# Patient Record
Sex: MALE | Race: WHITE | Employment: OTHER | URBAN - METROPOLITAN AREA
[De-identification: names, ages, dates, MRNs, and addresses within clinical notes are randomized per-mention and may not be internally consistent; named-entity substitution may affect disease eponyms.]

---

## 2018-09-12 PROBLEM — Z79.01 LONG TERM (CURRENT) USE OF ANTICOAGULANTS: Status: ACTIVE | Noted: 2018-09-12

## 2020-07-15 PROBLEM — E11.9 CONTROLLED TYPE 2 DIABETES MELLITUS WITHOUT COMPLICATION, WITHOUT LONG-TERM CURRENT USE OF INSULIN (HCC): Status: ACTIVE | Noted: 2020-07-15

## 2020-12-17 PROBLEM — I50.22 CHRONIC SYSTOLIC CONGESTIVE HEART FAILURE (HCC): Status: ACTIVE | Noted: 2020-12-17

## 2021-01-15 ENCOUNTER — HOSPITAL ENCOUNTER (OUTPATIENT)
Dept: LAB | Age: 86
Discharge: HOME | End: 2021-01-15
Payer: MEDICARE

## 2021-01-15 DIAGNOSIS — I25.10 ATHEROSCLEROSIS OF NATIVE CORONARY ARTERY OF NATIVE HEART WITHOUT ANGINA PECTORIS: ICD-10-CM

## 2021-01-15 DIAGNOSIS — I50.22 CHRONIC SYSTOLIC CONGESTIVE HEART FAILURE (HCC): ICD-10-CM

## 2021-01-15 DIAGNOSIS — I48.11 LONGSTANDING PERSISTENT ATRIAL FIBRILLATION (HCC): ICD-10-CM

## 2021-01-15 LAB
ANION GAP SERPL CALC-SCNC: 3 MMOL/L (ref 7–16)
BNP SERPL-MCNC: 2491 PG/ML
BUN SERPL-MCNC: 29 MG/DL (ref 8–23)
CALCIUM SERPL-MCNC: 9 MG/DL (ref 8.3–10.4)
CHLORIDE SERPL-SCNC: 109 MMOL/L (ref 98–107)
CO2 SERPL-SCNC: 29 MMOL/L (ref 21–32)
CREAT SERPL-MCNC: 0.95 MG/DL (ref 0.8–1.5)
GLUCOSE SERPL-MCNC: 87 MG/DL (ref 65–100)
INR PPP: 1.9
POTASSIUM SERPL-SCNC: 3.9 MMOL/L (ref 3.5–5.1)
PROTHROMBIN TIME: 21.7 SEC (ref 12.5–14.7)
SODIUM SERPL-SCNC: 141 MMOL/L (ref 138–145)

## 2021-01-15 PROCEDURE — 83880 ASSAY OF NATRIURETIC PEPTIDE: CPT

## 2021-01-15 PROCEDURE — 36415 COLL VENOUS BLD VENIPUNCTURE: CPT

## 2021-01-15 PROCEDURE — 85610 PROTHROMBIN TIME: CPT

## 2021-01-15 PROCEDURE — 80048 BASIC METABOLIC PNL TOTAL CA: CPT

## 2021-02-15 PROBLEM — N40.1 BENIGN PROSTATIC HYPERPLASIA WITH URINARY FREQUENCY: Status: ACTIVE | Noted: 2021-02-15

## 2021-02-15 PROBLEM — R35.0 BENIGN PROSTATIC HYPERPLASIA WITH URINARY FREQUENCY: Status: ACTIVE | Noted: 2021-02-15

## 2022-03-18 PROBLEM — I50.22 CHRONIC SYSTOLIC CONGESTIVE HEART FAILURE (HCC): Status: ACTIVE | Noted: 2020-12-17

## 2022-03-18 PROBLEM — E11.9 CONTROLLED TYPE 2 DIABETES MELLITUS WITHOUT COMPLICATION, WITHOUT LONG-TERM CURRENT USE OF INSULIN (HCC): Status: ACTIVE | Noted: 2020-07-15

## 2022-03-19 PROBLEM — R35.0 BENIGN PROSTATIC HYPERPLASIA WITH URINARY FREQUENCY: Status: ACTIVE | Noted: 2021-02-15

## 2022-03-19 PROBLEM — Z79.01 LONG TERM (CURRENT) USE OF ANTICOAGULANTS: Status: ACTIVE | Noted: 2018-09-12

## 2022-03-19 PROBLEM — N40.1 BENIGN PROSTATIC HYPERPLASIA WITH URINARY FREQUENCY: Status: ACTIVE | Noted: 2021-02-15

## 2022-05-24 ENCOUNTER — ANTI-COAG VISIT (OUTPATIENT)
Dept: CARDIOLOGY CLINIC | Age: 87
End: 2022-05-24
Payer: MEDICARE

## 2022-05-24 DIAGNOSIS — Z95.2 H/O AORTIC VALVE REPLACEMENT: ICD-10-CM

## 2022-05-24 DIAGNOSIS — Z79.01 LONG TERM (CURRENT) USE OF ANTICOAGULANTS: ICD-10-CM

## 2022-05-24 DIAGNOSIS — I48.91 ATRIAL FIBRILLATION (HCC): Primary | ICD-10-CM

## 2022-05-24 LAB
POC INR: 2.5
PROTHROMBIN TIME, POC: NORMAL
VALID INTERNAL CONTROL, POC: YES

## 2022-05-24 PROCEDURE — 85610 PROTHROMBIN TIME: CPT | Performed by: INTERNAL MEDICINE

## 2022-05-24 PROCEDURE — 93793 ANTICOAG MGMT PT WARFARIN: CPT | Performed by: INTERNAL MEDICINE

## 2022-05-24 NOTE — PROGRESS NOTES
Anticoagulation Summary  As of 5/24/2022    INR goal:  2.0-3.0   TTR:  --   INR used for dosing:     Warfarin maintenance plan:  5 mg (5 mg x 1) every Mon, Wed, Fri; 7.5 mg (5 mg x 1.5) all other days   Weekly warfarin total:  45 mg   Plan last modified:  Janelle Gray MA (5/24/2022)   Next INR check:  6/21/2022   Target end date:   Indefinite    Indications    H/O aortic valve replacement [Z95.2]  Atrial fibrillation ([facility] [address] [I48.91]  Long term (current) use of anticoagulants [Z79.01]             Anticoagulation Episode Summary     INR check location:  Anticoagulation Clinic    Preferred lab:      Send INR reminders to:  [facility] CARDIOLOGY [facility]    Comments:        Anticoagulation Care Providers     Provider Role Specialty Phone number    Vera Bradley MD [facility] Cardiology [phone]

## 2022-06-21 ENCOUNTER — ANTI-COAG VISIT (OUTPATIENT)
Dept: CARDIOLOGY CLINIC | Age: 87
End: 2022-06-21
Payer: MEDICARE

## 2022-06-21 DIAGNOSIS — I48.91 ATRIAL FIBRILLATION (HCC): ICD-10-CM

## 2022-06-21 DIAGNOSIS — Z95.2 H/O AORTIC VALVE REPLACEMENT: Primary | ICD-10-CM

## 2022-06-21 DIAGNOSIS — Z79.01 LONG TERM (CURRENT) USE OF ANTICOAGULANTS: ICD-10-CM

## 2022-06-21 LAB
POC INR: 2.7
PROTHROMBIN TIME, POC: NORMAL
VALID INTERNAL CONTROL, POC: YES

## 2022-06-21 PROCEDURE — 93793 ANTICOAG MGMT PT WARFARIN: CPT | Performed by: INTERNAL MEDICINE

## 2022-06-21 PROCEDURE — 85610 PROTHROMBIN TIME: CPT | Performed by: INTERNAL MEDICINE

## 2022-06-21 NOTE — PROGRESS NOTES
Anticoagulation Summary  As of 2022    INR goal:  2.0-3.0   TTR:  100.0 % (2.6 wk)   INR used for dosin.7 (2022)   Warfarin maintenance plan:  5 mg (5 mg x 1) every Mon, Wed, Fri; 7.5 mg (5 mg x 1.5) all other days   Weekly warfarin total:  45 mg   Plan last modified:  Ashanti Small MA (2022)   Next INR check:  2022   Target end date:   Indefinite    Indications    H/O aortic valve replacement [Z95.2]  Atrial fibrillation ([address] [I48.91]  Long term (current) use of anticoagulants [Z79.01]             Anticoagulation Episode Summary     INR check location:  Anticoagulation Clinic    Preferred lab:      Send INR reminders to:  [facility] CARDIOLOGY [facility]    Comments:        Anticoagulation Care Providers     Provider Role Specialty Phone number    Tyler Ross MD [facility] Cardiology [phone]

## 2022-06-21 NOTE — PATIENT INSTRUCTIONS
Reminder: Please contact the Coumadin Clinic at [phone]  when you have medication changes. Examples, new medications, antibiotics, discontinued medications, new supplements, missed doses of warfarin or if you took extra doses of warfarin. This also includes OTC medications. Notifying us helps reduce the possibility of high and low INR's. In addition, if warfarin needs to be held for any procedures, please have surgeon or physician's office contact us before holding anticoagulant. Thanks, [address] Cardiology Coumadin Clinic.

## 2022-07-18 ENCOUNTER — ANTI-COAG VISIT (OUTPATIENT)
Dept: CARDIOLOGY CLINIC | Age: 87
End: 2022-07-18
Payer: MEDICARE

## 2022-07-18 DIAGNOSIS — Z95.2 H/O AORTIC VALVE REPLACEMENT: Primary | ICD-10-CM

## 2022-07-18 DIAGNOSIS — Z79.01 LONG TERM (CURRENT) USE OF ANTICOAGULANTS: ICD-10-CM

## 2022-07-18 DIAGNOSIS — I48.91 ATRIAL FIBRILLATION (HCC): ICD-10-CM

## 2022-07-18 LAB
POC INR: 3
PROTHROMBIN TIME, POC: NORMAL
VALID INTERNAL CONTROL, POC: YES

## 2022-07-18 PROCEDURE — 85610 PROTHROMBIN TIME: CPT | Performed by: INTERNAL MEDICINE

## 2022-07-18 PROCEDURE — 93793 ANTICOAG MGMT PT WARFARIN: CPT | Performed by: INTERNAL MEDICINE

## 2022-07-18 NOTE — PROGRESS NOTES
Anticoagulation Summary  As of 7/18/2022      INR goal:  2.0-3.0   TTR:  100.0 % (1.5 mo)   INR used for dosing:  3.0 (7/18/2022)   Warfarin maintenance plan:  5 mg (5 mg x 1) every Mon, Wed, Fri; 7.5 mg (5 mg x 1.5) all other days   Weekly warfarin total:  45 mg   Plan last modified:  Tasneem Hamilton MA (5/24/2022)   Next INR check:     Target end date:   Indefinite    Indications    H/O aortic valve replacement [Z95.2]  Atrial fibrillation ([facility] [address] [I48.91]  Long term (current) use of anticoagulants [Z79.01]                 Anticoagulation Episode Summary       INR check location:  Anticoagulation Clinic    Preferred lab:      Send INR reminders to:  [facility] CARDIOLOGY [facility]    Comments:            Anticoagulation Care Providers       Provider Role Specialty Phone number    Bob Tello MD [facility] Cardiology [phone]

## 2022-07-18 NOTE — PATIENT INSTRUCTIONS
Reminder: Please contact the Coumadin Clinic at [phone]  when you have medication changes. Examples, new medications, antibiotics, discontinued medications, new supplements, missed doses of warfarin or if you took extra doses of warfarin. This also includes OTC medications. Notifying us helps reduce the possibility of high and low INR's. In addition, if warfarin needs to be held for any procedures, please have surgeon or physician's office contact us before holding anticoagulant. Thanks, [facility] Cardiology Coumadin Clinic.

## 2022-08-16 ENCOUNTER — ANTI-COAG VISIT (OUTPATIENT)
Dept: CARDIOLOGY CLINIC | Age: 87
End: 2022-08-16
Payer: MEDICARE

## 2022-08-16 DIAGNOSIS — I48.91 ATRIAL FIBRILLATION (HCC): ICD-10-CM

## 2022-08-16 DIAGNOSIS — Z95.2 H/O AORTIC VALVE REPLACEMENT: Primary | ICD-10-CM

## 2022-08-16 DIAGNOSIS — Z79.01 LONG TERM (CURRENT) USE OF ANTICOAGULANTS: ICD-10-CM

## 2022-08-16 LAB
POC INR: 2.4
PROTHROMBIN TIME, POC: YES

## 2022-08-16 PROCEDURE — 85610 PROTHROMBIN TIME: CPT | Performed by: INTERNAL MEDICINE

## 2022-08-16 PROCEDURE — 93793 ANTICOAG MGMT PT WARFARIN: CPT | Performed by: INTERNAL MEDICINE

## 2022-08-16 NOTE — PROGRESS NOTES
Anticoagulation Summary  As of 2022      INR goal:  2.0-3.0   TTR:  100.0 % (2.5 mo)   INR used for dosin.4 (2022)   Warfarin maintenance plan:  5 mg (5 mg x 1) every Mon, Wed, Fri; 7.5 mg (5 mg x 1.5) all other days   Weekly warfarin total:  45 mg   Plan last modified:  Gerson Romero MA (2022)   Next INR check:  2022   Target end date:   Indefinite    Indications    H/O aortic valve replacement [Z95.2]  Atrial fibrillation ([address] [I48.91]  Long term (current) use of anticoagulants [Z79.01]                 Anticoagulation Episode Summary       INR check location:  Anticoagulation Clinic    Preferred lab:      Send INR reminders to:  [facility]OLOG[facility]    Comments:            Anticoagulation Care Providers       Provider Role Specialty Phone number    Erin Gregg MD [facility] Cardiology [phone]

## 2022-08-16 NOTE — PATIENT INSTRUCTIONS
Reminder: Please contact the Coumadin Clinic at [phone]  when you have medication changes. Examples, new medications, antibiotics, discontinued medications, new supplements, missed doses of warfarin or if you took extra doses of warfarin. This also includes OTC medications. Notifying us helps reduce the possibility of high and low INR's. In addition, if warfarin needs to be held for any procedures, please have surgeon or physician's office contact us before holding anticoagulant. Thanks, [facility] Cardiology Coumadin Clinic.

## 2022-09-13 ENCOUNTER — ANTI-COAG VISIT (OUTPATIENT)
Dept: CARDIOLOGY CLINIC | Age: 87
End: 2022-09-13
Payer: MEDICARE

## 2022-09-13 DIAGNOSIS — Z95.2 H/O AORTIC VALVE REPLACEMENT: Primary | ICD-10-CM

## 2022-09-13 DIAGNOSIS — I48.91 ATRIAL FIBRILLATION (HCC): ICD-10-CM

## 2022-09-13 DIAGNOSIS — Z79.01 LONG TERM (CURRENT) USE OF ANTICOAGULANTS: ICD-10-CM

## 2022-09-13 LAB
POC INR: 2.1
PROTHROMBIN TIME, POC: YES

## 2022-09-13 PROCEDURE — 93793 ANTICOAG MGMT PT WARFARIN: CPT | Performed by: INTERNAL MEDICINE

## 2022-09-13 PROCEDURE — 85610 PROTHROMBIN TIME: CPT | Performed by: INTERNAL MEDICINE

## 2022-09-13 NOTE — PROGRESS NOTES
Anticoagulation Summary  As of 2022      INR goal:  2.0-3.0   TTR:  100.0 % (3.4 mo)   INR used for dosin.1 (2022)   Warfarin maintenance plan:  5 mg (5 mg x 1) every Mon, Wed, Fri; 7.5 mg (5 mg x 1.5) all other days   Weekly warfarin total:  45 mg   Plan last modified:  Rina Paez MA (2022)   Next INR check:  10/11/2022   Target end date:   Indefinite    Indications    H/O aortic valve replacement [Z95.2]  Atrial fibrillation ([address] [I48.91]  Long term (current) use of anticoagulants [Z79.01]                 Anticoagulation Episode Summary       INR check location:  Anticoagulation Clinic    Preferred lab:      Send INR reminders to:  [facility]    Comments:            Anticoagulation Care Providers       Provider Role Specialty Phone number    Lindsay Mcdermott MD [facility] [phone]

## 2022-09-13 NOTE — PATIENT INSTRUCTIONS
Reminder: Please contact the Coumadin Clinic at [phone]  when you have medication changes. Examples, new medications, antibiotics, discontinued medications, new supplements, missed doses of warfarin or if you took extra doses of warfarin. This also includes OTC medications. Notifying us helps reduce the possibility of high and low INR's. In addition, if warfarin needs to be held for any procedures, please have surgeon or physician's office contact us before holding anticoagulant. Thanks, [facility] Cardiology Coumadin Clinic.

## 2022-10-11 RX ORDER — ATORVASTATIN CALCIUM 40 MG/1
TABLET, FILM COATED ORAL
Qty: 90 TABLET | Refills: 3 | Status: SHIPPED | OUTPATIENT
Start: 2022-10-11

## 2022-10-13 DIAGNOSIS — I50.22 CHRONIC SYSTOLIC (CONGESTIVE) HEART FAILURE (HCC): ICD-10-CM

## 2022-10-13 RX ORDER — FUROSEMIDE 40 MG/1
TABLET ORAL
Qty: 90 TABLET | Refills: 3 | Status: SHIPPED | OUTPATIENT
Start: 2022-10-13

## 2022-10-19 ENCOUNTER — ANTI-COAG VISIT (OUTPATIENT)
Dept: CARDIOLOGY CLINIC | Age: 87
End: 2022-10-19
Payer: MEDICARE

## 2022-10-19 ENCOUNTER — OFFICE VISIT (OUTPATIENT)
Dept: CARDIOLOGY CLINIC | Age: 87
End: 2022-10-19
Payer: MEDICARE

## 2022-10-19 VITALS
DIASTOLIC BLOOD PRESSURE: 52 MMHG | BODY MASS INDEX: 21.4 KG/M2 | HEART RATE: 54 BPM | SYSTOLIC BLOOD PRESSURE: 116 MMHG | WEIGHT: 158 LBS | HEIGHT: 72 IN

## 2022-10-19 DIAGNOSIS — I10 ESSENTIAL HYPERTENSION: ICD-10-CM

## 2022-10-19 DIAGNOSIS — I50.22 CHRONIC SYSTOLIC CONGESTIVE HEART FAILURE (HCC): Primary | ICD-10-CM

## 2022-10-19 DIAGNOSIS — E78.2 MIXED HYPERLIPIDEMIA: ICD-10-CM

## 2022-10-19 DIAGNOSIS — I48.91 ATRIAL FIBRILLATION (HCC): ICD-10-CM

## 2022-10-19 DIAGNOSIS — Z79.01 LONG TERM (CURRENT) USE OF ANTICOAGULANTS: ICD-10-CM

## 2022-10-19 DIAGNOSIS — I48.11 LONGSTANDING PERSISTENT ATRIAL FIBRILLATION (HCC): ICD-10-CM

## 2022-10-19 DIAGNOSIS — Z95.2 H/O AORTIC VALVE REPLACEMENT: Primary | ICD-10-CM

## 2022-10-19 DIAGNOSIS — I25.10 ATHEROSCLEROSIS OF NATIVE CORONARY ARTERY OF NATIVE HEART WITHOUT ANGINA PECTORIS: ICD-10-CM

## 2022-10-19 LAB
POC INR: 2
PROTHROMBIN TIME, POC: YES

## 2022-10-19 PROCEDURE — G8484 FLU IMMUNIZE NO ADMIN: HCPCS | Performed by: INTERNAL MEDICINE

## 2022-10-19 PROCEDURE — 1123F ACP DISCUSS/DSCN MKR DOCD: CPT | Performed by: INTERNAL MEDICINE

## 2022-10-19 PROCEDURE — 4004F PT TOBACCO SCREEN RCVD TLK: CPT | Performed by: INTERNAL MEDICINE

## 2022-10-19 PROCEDURE — 85610 PROTHROMBIN TIME: CPT | Performed by: INTERNAL MEDICINE

## 2022-10-19 PROCEDURE — G8420 CALC BMI NORM PARAMETERS: HCPCS | Performed by: INTERNAL MEDICINE

## 2022-10-19 PROCEDURE — 99214 OFFICE O/P EST MOD 30 MIN: CPT | Performed by: INTERNAL MEDICINE

## 2022-10-19 PROCEDURE — G8428 CUR MEDS NOT DOCUMENT: HCPCS | Performed by: INTERNAL MEDICINE

## 2022-10-19 ASSESSMENT — ENCOUNTER SYMPTOMS: SHORTNESS OF BREATH: 0

## 2022-10-19 NOTE — PROGRESS NOTES
Anticoagulation Summary  As of 10/19/2022      INR goal:  2.0-3.0   TTR:  100.0 % (4.6 mo)   INR used for dosin.0 (10/19/2022)   Warfarin maintenance plan:  5 mg (5 mg x 1) every Mon, Wed, Fri; 7.5 mg (5 mg x 1.5) all other days   Weekly warfarin total:  45 mg   Plan last modified:  Seb Leger MA (2022)   Next INR check:  2022   Target end date:   Indefinite    Indications    H/O aortic valve replacement [Z95.2]  Longstanding persistent atrial fibrillation ([address] [I48.11]  Long term (current) use of anticoagulants [Z79.01]                 Anticoagulation Episode Summary       INR check location:  Anticoagulation Clinic    Preferred lab:      Send INR reminders to:  [facility] PT    Comments:            Anticoagulation Care Providers       Provider Role Specialty Phone number    Renata Mcdaniels MD [facility] Cardiology [phone]

## 2022-10-19 NOTE — PROGRESS NOTES
[address] [place], [place]  [address], [address]  PHONE: [phone]    Den Carmichael  1934       SUBJECTIVE:   Den Carmichael is a 80 y.o. male seen for a follow up visit regarding the following:     Chief Complaint   Patient presents with    Congestive Heart Failure       HPI:    Den Carmichael presents for routine follow up. Multiple issues addressed. CONGESTIVE HEART FAILURE:  Patient denies any PND, orthopnea or edema. Weights at home have been stable. Compliance with medical therapy. Stable dyspnea on exertion. Coronary Artery Disease:  Patient denies any recent angina. Notes compliance with medical therapy. No recent NTG use. No intolerance to anti-platelet therapy. AVR:  No fever or chills. Hypertension:  Ambulatory BP readings have been controlled. Patient reports compliance with medical therapy without side effects. Hyperlipidemia:   Tolerating Lipitor. Persistent atrial fibrillation:  No palpitations or tachycardia. Tolerating Coumadin. No blood in stool or excessive bleeding. INR 2.0. Does not want to watchman at present time. Had prior retroperitoneal bleed. Past Medical History, Past Surgical History, Family history, Social History, and Medications were all reviewed with the patient today and updated as necessary. Current Outpatient Medications:     furosemide (LASIX) 40 MG tablet, TAKE 1 (ONE) TABLET BY MOUTH EVERY DAY, Disp: 90 tablet, Rfl: 3    atorvastatin (LIPITOR) 40 MG tablet, TAKE 1 (ONE) TABLET BY MOUTH EVERY DAY, Disp: 90 tablet, Rfl: 3    acetaminophen (TYLENOL) 325 MG tablet, Take by mouth every 4 hours as needed, Disp: , Rfl:     digoxin (LANOXIN) 125 MCG tablet, Take 0.125 mg by mouth daily, Disp: , Rfl:     gabapentin (NEURONTIN) 300 MG capsule, Take 300 mg by mouth 3 times daily. , Disp: , Rfl:     lisinopril (PRINIVIL;ZESTRIL) 10 MG tablet, Take 10 mg by mouth daily, Disp: , Rfl: metoprolol succinate (TOPROL XL) 100 MG extended release tablet, Take 100 mg by mouth daily, Disp: , Rfl:     primidone (MYSOLINE) 50 MG tablet, Take 50 mg by mouth daily, Disp: , Rfl:     tamsulosin (FLOMAX) 0.4 MG capsule, TAKE 1 (ONE) CAPSULE NIGHTLY., Disp: , Rfl:     warfarin (COUMADIN) 5 MG tablet, Take 1 to 1 1/2 tablets daily or as directed by [facility] Cardiology Coumadin Clinic, Disp: , Rfl:     warfarin (COUMADIN) 7.5 MG tablet, Take 1 tab 7 x weekly by mouth, or as directed by [facility] Cardiology. , Disp: , Rfl:      Not on File     Patient Active Problem List    Diagnosis Date Noted    Benign prostatic hyperplasia with urinary frequency 02/15/2021     Priority: Low    Chronic systolic congestive heart failure ([facility] [address] 12/17/2020     Priority: Low     1.  Echo (12/15/20):  EF 15-20%. Severe RV dysfunction. Normal   bioprosthetic AV. MG 8. PG 13. Moderate MR. Severe TR.  RVSP 36.   2.  Echo (5/3/21):  EF 45-50%. +DD. Mild RV HK. AVR:  Mild AR. MG 12.   PG 22. Moderate MR. Severe TR.  RVSP 51. Controlled type 2 diabetes mellitus without complication, without long-term current use of insulin ([facility] [address] 07/15/2020     Priority: Low    Long term (current) use of anticoagulants 09/12/2018     Priority: Low    Longstanding persistent atrial fibrillation (HCC)      Priority: Low     1. Initially diagnosed 6/17/10 during office exam. Rate control and   anticoagulation strategy. H/O aortic valve replacement 02/11/2016     Priority: Low     1. AVR with 23 mm pericardial valve (10/11/05): Prior severe calcific   aortic stenosis. 2. Echo (6/25/13): Normal LV systolic function. EF 53%. Moderate right   atrial enlargement. Normal bioprosthetic aortic valve. Peak gradient 15   mean gradient 9. Moderate tricuspid regurgitation. RVSP 55.  3. Echo (7/6/15): EF 65-70%. Normal aortic valve prosthesis. Severe   Tricuspid regurgitation. Moderately dilated RV with mild RV hypokinesis.    RVSP 35.  4.  Echo (11/2/16):  EF 57%. Normal bioprosthetic aortic valve. MG 12.    PG 18. Moderate TR.   RVSP 44  5. Echo (6/4/18):  EF 59%. Bi-atrial enlargement. Bioprosthetic Aortic   Valve. MG 10. PG 18. Mild to moderate MR. Moderate TR. RVSP 38  6. Echo (6/26/19):  EF 55-60%. Severe bi-atrial enlargement. Bioprosthetic AV. MG 13. PG 21. Mild MR. Moderate to severe TR. Low back pain 02/11/2016     Priority: Low    Hyperlipidemia 02/11/2016     Priority: Low    Essential hypertension 02/11/2016     Priority: Low    Atherosclerosis of native coronary artery of native heart without angina pectoris 02/11/2016     Priority: Low     1. 4 VZ CABG (10/11/05): LIMA to LAD, SVG to OM1, SVG to diagonal, SVG to   PDA        Carotid artery stenosis without cerebral infarction 02/11/2016     Priority: Low     1. Carotid Doppler (4/22/09): Right ICA with 1-15% stenosis. Left ICA with   16-49% stenosis  2. Carotid Doppler (1/12/12): Right ICA with 1-15% stenosis. Left ICA with   16-49% stenosis            Social History     Tobacco Use    Smoking status: Never    Smokeless tobacco: Never   Substance Use Topics    Alcohol use: No       ROS:    Review of Systems   Constitutional: Negative for malaise/fatigue. Cardiovascular:  Negative for chest pain. Respiratory:  Negative for shortness of breath. Hematologic/Lymphatic: Does not bruise/bleed easily. Musculoskeletal:  Positive for arthritis. Neurological:  Negative for focal weakness. Psychiatric/Behavioral:  Negative for depression. PHYSICAL EXAM:  Wt Readings from Last 3 Encounters:   10/19/22 158 lb (71.7 kg)   04/14/22 163 lb (73.9 kg)   01/19/22 147 lb (66.7 kg)     BP Readings from Last 3 Encounters:   10/19/22 (!) 116/52   04/14/22 138/69   01/19/22 117/63     Pulse Readings from Last 3 Encounters:   10/19/22 54   04/14/22 54   01/19/22 75       Physical Exam  Constitutional:       General: He is not in acute distress.   Neck:      Vascular: No carotid bruit. Cardiovascular:      Rate and Rhythm: Normal rate. Rhythm irregular. Pulmonary:      Effort: No respiratory distress. Breath sounds: Normal breath sounds. Abdominal:      General: Bowel sounds are normal.      Palpations: Abdomen is soft. Musculoskeletal:         General: Swelling (trivial to 1+) present. Skin:     General: Skin is warm and dry. Neurological:      General: No focal deficit present. Psychiatric:         Mood and Affect: Mood normal.       Medical problems and test results were reviewed with the patient today. No results found for: WBC, HGB, HCT, MCV, PLT    Lab Results   Component Value Date/Time     01/15/2021 04:34 PM    K 3.9 01/15/2021 04:34 PM     01/15/2021 04:34 PM    CO2 29 01/15/2021 04:34 PM    BUN 29 01/15/2021 04:34 PM    CREATININE 0.95 01/15/2021 04:34 PM    GLUCOSE 87 01/15/2021 04:34 PM    CALCIUM 9.0 01/15/2021 04:34 PM        No results found for: CHOL  No results found for: TRIG  No results found for: HDL  No results found for: LDLCHOLESTEROL, LDLCALC  No results found for: LABVLDL, VLDL  No results found for: CHOLHDLRATIO     Data from outside records/labs from outside providers have been reviewed and summarized as noted in the HPI, past history and data review sections of this note       ASSESSMENT and PLAN      1. Chronic systolic congestive heart failure (HCC)  Check echo. Continue Toprol and Lisinopril. 2. Atherosclerosis of native coronary artery of native heart without angina pectoris  Patient is doing well without any anginal symptoms. We discussed the importance of continued risk factor modification. The patient is encouraged to contact my office with any worsening dyspnea or chest discomfort. 3. Essential hypertension  BP controlled. Continue Lisinopril and Toprol. 4. Longstanding persistent atrial fibrillation (HCC)  Rate controlled. Continue Lanoxin and coumadin.   Goal INR 2-3.      5. Mixed hyperlipidemia  Continue Lipitor. Connor Tobin MD  10/19/2022  11:41 AM    This note may have been dictated using speech recognition software.   As a result, error of speech recognition may have occurred

## 2022-10-19 NOTE — PATIENT INSTRUCTIONS
Reminder: Please contact the Coumadin Clinic at [phone]  when you have medication changes. Examples, new medications, antibiotics, discontinued medications, new supplements, missed doses of warfarin or if you took extra doses of warfarin. This also includes OTC medications. Notifying us helps reduce the possibility of high and low INR's. In addition, if warfarin needs to be held for any procedures, please have surgeon or physician's office contact us before holding anticoagulant. Thanks, [facility] Cardiology Coumadin Clinic.

## 2022-11-16 ENCOUNTER — ANTI-COAG VISIT (OUTPATIENT)
Dept: CARDIOLOGY CLINIC | Age: 87
End: 2022-11-16
Payer: MEDICARE

## 2022-11-16 DIAGNOSIS — I48.91 ATRIAL FIBRILLATION (HCC): ICD-10-CM

## 2022-11-16 DIAGNOSIS — I48.11 LONGSTANDING PERSISTENT ATRIAL FIBRILLATION (HCC): ICD-10-CM

## 2022-11-16 DIAGNOSIS — Z95.2 H/O AORTIC VALVE REPLACEMENT: Primary | ICD-10-CM

## 2022-11-16 DIAGNOSIS — Z79.01 LONG TERM (CURRENT) USE OF ANTICOAGULANTS: ICD-10-CM

## 2022-11-16 LAB
POC INR: 2.2
PROTHROMBIN TIME, POC: YES

## 2022-11-16 PROCEDURE — 85610 PROTHROMBIN TIME: CPT | Performed by: INTERNAL MEDICINE

## 2022-11-16 PROCEDURE — 93793 ANTICOAG MGMT PT WARFARIN: CPT | Performed by: INTERNAL MEDICINE

## 2022-11-16 NOTE — PATIENT INSTRUCTIONS
Reminder: Please contact the Coumadin Clinic at [phone]  when you have medication changes. Examples, new medications, antibiotics, discontinued medications, new supplements, missed doses of warfarin or if you took extra doses of warfarin. This also includes OTC medications. Notifying us helps reduce the possibility of high and low INR's. In addition, if warfarin needs to be held for any procedures, please have surgeon or physician's office contact us before holding anticoagulant. Thanks, [facility] Cardiology Coumadin Clinic.

## 2022-11-17 RX ORDER — DIGOXIN 125 MCG
0.12 TABLET ORAL DAILY
Qty: 90 TABLET | Refills: 3 | Status: SHIPPED | OUTPATIENT
Start: 2022-11-17

## 2022-11-17 RX ORDER — METOPROLOL SUCCINATE 100 MG/1
100 TABLET, EXTENDED RELEASE ORAL DAILY
Qty: 90 TABLET | Refills: 3 | Status: SHIPPED | OUTPATIENT
Start: 2022-11-17

## 2022-11-17 NOTE — TELEPHONE ENCOUNTER
Prescriptions sent to pharmacy//[facility]  Requested Prescriptions     Signed Prescriptions Disp Refills    digoxin (LANOXIN) 125 MCG tablet 90 tablet 3     Sig: Take 1 tablet by mouth daily     Authorizing Provider: Nadege Sweet     Ordering User: Alicia Ferreira    metoprolol succinate (TOPROL XL) 100 MG extended release tablet 90 tablet 3     Sig: Take 1 tablet by mouth daily     Authorizing Provider: Nadege Sweet     Ordering User: Alicia Ferreira

## 2022-12-22 ENCOUNTER — ANTI-COAG VISIT (OUTPATIENT)
Dept: CARDIOLOGY CLINIC | Age: 87
End: 2022-12-22

## 2022-12-22 DIAGNOSIS — Z79.01 LONG TERM (CURRENT) USE OF ANTICOAGULANTS: ICD-10-CM

## 2022-12-22 DIAGNOSIS — I48.11 LONGSTANDING PERSISTENT ATRIAL FIBRILLATION (HCC): ICD-10-CM

## 2022-12-22 DIAGNOSIS — Z95.2 H/O AORTIC VALVE REPLACEMENT: Primary | ICD-10-CM

## 2022-12-22 DIAGNOSIS — I48.91 ATRIAL FIBRILLATION (HCC): ICD-10-CM

## 2022-12-22 LAB
POC INR: 5
PROTHROMBIN TIME, POC: YES

## 2022-12-22 NOTE — PROGRESS NOTES
Pt is above range at 5.0 today. Pt denies any medication or diet changes. Spoke with Dr. Harpal Francis. Per Dr. Harpal Francis, [facility] one day. On one of his 5 mg days take 1/2 a tablet and on one 7.5 mg day take 1 tablet. \" Pt had already taken warfarin today. Will recheck in 1 week. Pt is agreeable with plan. Anticoagulation Summary  As of 2022      INR goal:  2.0-3.0   TTR:  87.3 % (6.7 mo)   INR used for dosin.0 (2022)   Warfarin maintenance plan:  5 mg (5 mg x 1) every Mon, Wed, Fri; 7.5 mg (5 mg x 1.5) all other days; Starting 2022   Weekly warfarin total:  45 mg   Plan last modified:  Luzma De Dios MA (2022)   Next INR check:  2022   Target end date:   Indefinite    Indications    H/O aortic valve replacement [Z95.2]  Longstanding persistent atrial fibrillation ([address] [I48.11]  Long term (current) use of anticoagulants [Z79.01]                 Anticoagulation Episode Summary       INR check location:  Anticoagulation Clinic    Preferred lab:      Send INR reminders to:  [address] PT    Comments:            Anticoagulation Care Providers       Provider Role Specialty Phone number    Humera Manuel MD Responsible Cardiology [phone]

## 2022-12-22 NOTE — PATIENT INSTRUCTIONS
Reminder: Please contact the Coumadin Clinic at [phone]  when you have medication changes. Examples, new medications, antibiotics, discontinued medications, new supplements, missed doses of warfarin or if you took extra doses of warfarin. This also includes OTC medications. Notifying us helps reduce the possibility of high and low INR's. In addition, if warfarin needs to be held for any procedures, please have surgeon or physician's office contact us before holding anticoagulant. Thanks, [facility] Cardiology Coumadin Clinic.

## 2022-12-29 ENCOUNTER — ANTI-COAG VISIT (OUTPATIENT)
Dept: CARDIOLOGY CLINIC | Age: 87
End: 2022-12-29
Payer: MEDICARE

## 2022-12-29 DIAGNOSIS — I48.11 LONGSTANDING PERSISTENT ATRIAL FIBRILLATION (HCC): ICD-10-CM

## 2022-12-29 DIAGNOSIS — I48.91 ATRIAL FIBRILLATION (HCC): ICD-10-CM

## 2022-12-29 DIAGNOSIS — Z95.2 H/O AORTIC VALVE REPLACEMENT: Primary | ICD-10-CM

## 2022-12-29 DIAGNOSIS — Z79.01 LONG TERM (CURRENT) USE OF ANTICOAGULANTS: ICD-10-CM

## 2022-12-29 LAB
POC INR: 1.3
PROTHROMBIN TIME, POC: YES

## 2022-12-29 PROCEDURE — 93793 ANTICOAG MGMT PT WARFARIN: CPT | Performed by: INTERNAL MEDICINE

## 2022-12-29 PROCEDURE — 85610 PROTHROMBIN TIME: CPT | Performed by: INTERNAL MEDICINE

## 2022-12-29 NOTE — PATIENT INSTRUCTIONS
Reminder: Please contact the Coumadin Clinic at [phone]  when you have medication changes. Examples, new medications, antibiotics, discontinued medications, new supplements, missed doses of warfarin or if you took extra doses of warfarin. This also includes OTC medications. Notifying us helps reduce the possibility of high and low INR's. In addition, if warfarin needs to be held for any procedures, please have surgeon or physician's office contact us before holding anticoagulant. Thanks, [facility] Cardiology Coumadin Clinic.

## 2022-12-29 NOTE — PROGRESS NOTES
Pt is below range, pt is not sure what dosage he took. Will inrease and recheck in 2 weeks//KM    Anticoagulation Summary  As of 2022      INR goal:  2.0-3.0   TTR:  85.3 % (7 mo)   INR used for dosin.3 (2022)   Warfarin maintenance plan:  5 mg (5 mg x 1) every Mon, Wed, Fri; 7.5 mg (5 mg x 1.5) all other days; Starting 2022   Weekly warfarin total:  45 mg   Plan last modified:  Tiara Fischer MA (2022)   Next INR check:     Target end date:   Indefinite    Indications    H/O aortic valve replacement [Z95.2]  Longstanding persistent atrial fibrillation ([address] [I48.11]  Long term (current) use of anticoagulants [Z79.01]                 Anticoagulation Episode Summary       INR check location:  Anticoagulation Clinic    Preferred lab:      Send INR reminders to:  [address] PT    Comments:            Anticoagulation Care Providers       Provider Role Specialty Phone number    Efra He MD Responsible Cardiology [phone]

## 2023-01-12 ENCOUNTER — ANTI-COAG VISIT (OUTPATIENT)
Dept: CARDIOLOGY CLINIC | Age: 88
End: 2023-01-12

## 2023-01-12 DIAGNOSIS — I48.91 ATRIAL FIBRILLATION (HCC): ICD-10-CM

## 2023-01-12 DIAGNOSIS — Z95.2 H/O AORTIC VALVE REPLACEMENT: Primary | ICD-10-CM

## 2023-01-12 DIAGNOSIS — Z79.01 LONG TERM (CURRENT) USE OF ANTICOAGULANTS: ICD-10-CM

## 2023-01-12 DIAGNOSIS — I48.11 LONGSTANDING PERSISTENT ATRIAL FIBRILLATION (HCC): ICD-10-CM

## 2023-01-12 LAB
POC INR: 1.3
PROTHROMBIN TIME, POC: YES

## 2023-01-12 NOTE — PROGRESS NOTES
Pt is below range, he admits that he forgets to take his medication all the time. Pt seems upset that he is forgetting things. Pt forgot to come to initial appt, so he was added as a  walk in. Will increase dose and recheck in 2 weeks//KM    Anticoagulation Summary  As of 2023      INR goal:  2.0-3.0   TTR:  79.9 % (7.4 mo)   INR used for dosin.3 (2023)   Warfarin maintenance plan:  5 mg (5 mg x 1) every Mon, Wed, Fri; 7.5 mg (7.5 mg x 1) all other days; Starting 2023   Weekly warfarin total:  45 mg   Plan last modified:  Lia Hanson MA (2022)   Next INR check:     Target end date:   Indefinite    Indications    H/O aortic valve replacement [Z95.2]  Longstanding persistent atrial fibrillation ([address] [I48.11]  Long term (current) use of anticoagulants [Z79.01]                 Anticoagulation Episode Summary       INR check location:  Anticoagulation Clinic    Preferred lab:      Send INR reminders to:  [address] PT    Comments:            Anticoagulation Care Providers       Provider Role Specialty Phone number    Lashell Olea MD [facility] Cardiology [phone]

## 2023-01-12 NOTE — PATIENT INSTRUCTIONS
Reminder: Please contact the Coumadin Clinic at [phone]  when you have medication changes. Examples, new medications, antibiotics, discontinued medications, new supplements, missed doses of warfarin or if you took extra doses of warfarin. This also includes OTC medications. Notifying us helps reduce the possibility of high and low INR's. In addition, if warfarin needs to be held for any procedures, please have surgeon or physician's office contact us before holding anticoagulant. Thanks, [facility] Cardiology Coumadin Clinic.

## 2023-01-27 ENCOUNTER — ANTI-COAG VISIT (OUTPATIENT)
Dept: CARDIOLOGY CLINIC | Age: 88
End: 2023-01-27

## 2023-01-27 DIAGNOSIS — I48.11 LONGSTANDING PERSISTENT ATRIAL FIBRILLATION (HCC): ICD-10-CM

## 2023-01-27 DIAGNOSIS — Z95.2 H/O AORTIC VALVE REPLACEMENT: Primary | ICD-10-CM

## 2023-01-27 DIAGNOSIS — Z79.01 LONG TERM (CURRENT) USE OF ANTICOAGULANTS: ICD-10-CM

## 2023-01-27 DIAGNOSIS — I48.91 ATRIAL FIBRILLATION (HCC): ICD-10-CM

## 2023-01-27 LAB
POC INR: 2
PROTHROMBIN TIME, POC: YES

## 2023-01-27 NOTE — PROGRESS NOTES
Anticoagulation Summary  As of 2023      INR goal:  2.0-3.0   TTR:  74.9 % (7.9 mo)   INR used for dosin.0 (2023)   Warfarin maintenance plan:  5 mg (5 mg x 1) every Mon, Wed, Fri; 7.5 mg (7.5 mg x 1) all other days; Starting 2023   Weekly warfarin total:  45 mg   Plan last modified:  Eloisa Hampton MA (2022)   Next INR check:  2/10/2023   Target end date:   Indefinite    Indications    H/O aortic valve replacement [Z95.2]  Longstanding persistent atrial fibrillation ([address] [I48.11]  Long term (current) use of anticoagulants [Z79.01]                 Anticoagulation Episode Summary       INR check location:  Anticoagulation Clinic    Preferred lab:      Send INR reminders to:  [facility] PT    Comments:            Anticoagulation Care Providers       Provider Role Specialty Phone number    Colonel Mehran MD [facility] Cardiology [phone]

## 2023-02-09 ENCOUNTER — ANTI-COAG VISIT (OUTPATIENT)
Dept: CARDIOLOGY CLINIC | Age: 88
End: 2023-02-09

## 2023-02-09 DIAGNOSIS — Z95.2 H/O AORTIC VALVE REPLACEMENT: Primary | ICD-10-CM

## 2023-02-09 DIAGNOSIS — I48.11 LONGSTANDING PERSISTENT ATRIAL FIBRILLATION (HCC): ICD-10-CM

## 2023-02-09 DIAGNOSIS — I48.91 ATRIAL FIBRILLATION (HCC): ICD-10-CM

## 2023-02-09 DIAGNOSIS — Z79.01 LONG TERM (CURRENT) USE OF ANTICOAGULANTS: ICD-10-CM

## 2023-02-09 LAB
POC INR: 1.6
PROTHROMBIN TIME, POC: NORMAL

## 2023-02-09 NOTE — PATIENT INSTRUCTIONS
Reminder: Please contact the Coumadin Clinic at [phone]  when you have medication changes. Examples, new medications, antibiotics, discontinued medications, new supplements, missed doses of warfarin or if you took extra doses of warfarin. This also includes OTC medications. Notifying us helps reduce the possibility of high and low INR's. In addition, if warfarin needs to be held for any procedures, please have surgeon or physician's office contact us before holding anticoagulant. Thanks, [facility] Coumadin Clinic.

## 2023-02-09 NOTE — PROGRESS NOTES
Pt is below range, pt states that he missed 2 days of warfarin. Will increase and recheck in 2 weeks//KM    Anticoagulation Summary  As of 2023      INR goal:  2.0-3.0   TTR:  71.0 % (8.4 mo)   INR used for dosin.6 (2023)   Warfarin maintenance plan:  5 mg (5 mg x 1) every Mon, Wed, Fri; 7.5 mg (7.5 mg x 1) all other days; Starting 2023   Weekly warfarin total:  45 mg   Plan last modified:  Shu Sandoval MA (2022)   Next INR check:     Target end date:   Indefinite    Indications    H/O aortic valve replacement [Z95.2]  Longstanding persistent atrial fibrillation ([address] [I48.11]  Long term (current) use of anticoagulants [Z79.01]                 Anticoagulation Episode Summary       INR check location:  Anticoagulation Clinic    Preferred lab:      Send INR reminders to:  [address] PT    Comments:            Anticoagulation Care Providers       Provider Role Specialty Phone number    Douglas Arora MD Responsible Cardiology [phone]

## 2023-02-23 ENCOUNTER — ANTI-COAG VISIT (OUTPATIENT)
Dept: CARDIOLOGY CLINIC | Age: 88
End: 2023-02-23

## 2023-02-23 DIAGNOSIS — Z95.2 H/O AORTIC VALVE REPLACEMENT: Primary | ICD-10-CM

## 2023-02-23 DIAGNOSIS — I48.11 LONGSTANDING PERSISTENT ATRIAL FIBRILLATION (HCC): ICD-10-CM

## 2023-02-23 DIAGNOSIS — I48.91 ATRIAL FIBRILLATION (HCC): ICD-10-CM

## 2023-02-23 DIAGNOSIS — Z79.01 LONG TERM (CURRENT) USE OF ANTICOAGULANTS: ICD-10-CM

## 2023-02-23 LAB
POC INR: 2.5
PROTHROMBIN TIME, POC: NORMAL

## 2023-02-23 NOTE — PATIENT INSTRUCTIONS
Reminder: Please contact the Coumadin Clinic at [phone]  when you have medication changes. Examples, new medications, antibiotics, discontinued medications, new supplements, missed doses of warfarin or if you took extra doses of warfarin. This also includes OTC medications. Notifying us helps reduce the possibility of high and low INR's. In addition, if warfarin needs to be held for any procedures, please have surgeon or physician's office contact us before holding anticoagulant. Thanks, [facility] Cardiology Coumadin Clinic.

## 2023-02-23 NOTE — PROGRESS NOTES
Anticoagulation Summary  As of 2023      INR goal:  2.0-3.0   TTR:  70.2 % (8.8 mo)   INR used for dosin.5 (2023)   Warfarin maintenance plan:  5 mg (5 mg x 1) every Mon, Wed, Fri; 7.5 mg (7.5 mg x 1) all other days; Starting 2023   Weekly warfarin total:  45 mg   Plan last modified:  Rhiannon Foster MA (2022)   Next INR check:  3/9/2023   Target end date:   Indefinite    Indications    H/O aortic valve replacement [Z95.2]  Longstanding persistent atrial fibrillation ([address] [I48.11]  Long term (current) use of anticoagulants [Z79.01]                 Anticoagulation Episode Summary       INR check location:  Anticoagulation Clinic    Preferred lab:      Send INR reminders to:  2 [facility] PT    Comments:            Anticoagulation Care Providers       Provider Role Specialty Phone number    Joanne Dumont MD [facility] Cardiology [phone]

## 2023-03-09 ENCOUNTER — ANTI-COAG VISIT (OUTPATIENT)
Dept: CARDIOLOGY CLINIC | Age: 88
End: 2023-03-09
Payer: MEDICARE

## 2023-03-09 DIAGNOSIS — Z95.2 H/O AORTIC VALVE REPLACEMENT: Primary | ICD-10-CM

## 2023-03-09 DIAGNOSIS — I48.91 ATRIAL FIBRILLATION (HCC): ICD-10-CM

## 2023-03-09 DIAGNOSIS — I48.11 LONGSTANDING PERSISTENT ATRIAL FIBRILLATION (HCC): ICD-10-CM

## 2023-03-09 DIAGNOSIS — Z79.01 LONG TERM (CURRENT) USE OF ANTICOAGULANTS: ICD-10-CM

## 2023-03-09 LAB
POC INR: 3
PROTHROMBIN TIME, POC: NORMAL

## 2023-03-09 PROCEDURE — 93793 ANTICOAG MGMT PT WARFARIN: CPT | Performed by: INTERNAL MEDICINE

## 2023-03-09 PROCEDURE — 85610 PROTHROMBIN TIME: CPT | Performed by: INTERNAL MEDICINE

## 2023-03-09 NOTE — PROGRESS NOTES
Anticoagulation Summary  As of 3/9/2023      INR goal:  2.0-3.0   TTR:  71.7 % (9.3 mo)   INR used for dosing:  3.0 (3/9/2023)   Warfarin maintenance plan:  5 mg (5 mg x 1) every Mon, Wed, Fri; 7.5 mg (7.5 mg x 1) all other days; Starting 3/9/2023   Weekly warfarin total:  45 mg   Plan last modified:  Gardenia Lea MA (12/29/2022)   Next INR check:  4/6/2023   Target end date:   Indefinite    Indications    H/O aortic valve replacement [Z95.2]  Longstanding persistent atrial fibrillation ([address] [I48.11]  Long term (current) use of anticoagulants [Z79.01]                 Anticoagulation Episode Summary       INR check location:  Anticoagulation Clinic    Preferred lab:      Send INR reminders to:  2 [facility] PT    Comments:            Anticoagulation Care Providers       Provider Role Specialty Phone number    Mackenzie Barajas MD [facility] Cardiology [phone]

## 2023-03-09 NOTE — PATIENT INSTRUCTIONS
Reminder: Please contact the Coumadin Clinic at [phone]  when you have medication changes. Examples, new medications, antibiotics, discontinued medications, new supplements, missed doses of warfarin or if you took extra doses of warfarin.  This also includes OTC medications. Notifying us helps reduce the possibility of high and low INR's. In addition, if warfarin needs to be held for any procedures, please have surgeon or physician's office contact us before holding anticoagulant. Thanks, [facility] Cardiology Coumadin Clinic.

## 2023-04-12 PROBLEM — I50.22 CHRONIC SYSTOLIC CONGESTIVE HEART FAILURE (HCC): Status: ACTIVE | Noted: 2020-12-17

## 2023-04-19 ENCOUNTER — HOSPITAL ENCOUNTER (INPATIENT)
Age: 88
LOS: 5 days | Discharge: HOME HEALTH | DRG: 291 | End: 2023-04-24
Attending: INTERNAL MEDICINE | Admitting: INTERNAL MEDICINE
Payer: MEDICARE

## 2023-04-19 ENCOUNTER — OFFICE VISIT (OUTPATIENT)
Dept: CARDIOLOGY CLINIC | Age: 88
End: 2023-04-19

## 2023-04-19 ENCOUNTER — ANTI-COAG VISIT (OUTPATIENT)
Dept: CARDIOLOGY CLINIC | Age: 88
End: 2023-04-19
Payer: MEDICARE

## 2023-04-19 VITALS
SYSTOLIC BLOOD PRESSURE: 140 MMHG | BODY MASS INDEX: 21.54 KG/M2 | DIASTOLIC BLOOD PRESSURE: 69 MMHG | HEIGHT: 72 IN | WEIGHT: 159 LBS | HEART RATE: 109 BPM

## 2023-04-19 DIAGNOSIS — I50.22 CHRONIC SYSTOLIC (CONGESTIVE) HEART FAILURE (HCC): ICD-10-CM

## 2023-04-19 DIAGNOSIS — I50.21 ACUTE SYSTOLIC CONGESTIVE HEART FAILURE (HCC): ICD-10-CM

## 2023-04-19 DIAGNOSIS — I48.91 ATRIAL FIBRILLATION (HCC): ICD-10-CM

## 2023-04-19 DIAGNOSIS — Z95.2 H/O AORTIC VALVE REPLACEMENT: ICD-10-CM

## 2023-04-19 DIAGNOSIS — Z79.01 LONG TERM (CURRENT) USE OF ANTICOAGULANTS: ICD-10-CM

## 2023-04-19 DIAGNOSIS — I48.11 LONGSTANDING PERSISTENT ATRIAL FIBRILLATION (HCC): ICD-10-CM

## 2023-04-19 DIAGNOSIS — I25.10 ATHEROSCLEROSIS OF NATIVE CORONARY ARTERY OF NATIVE HEART WITHOUT ANGINA PECTORIS: Primary | ICD-10-CM

## 2023-04-19 DIAGNOSIS — I07.1 SEVERE TRICUSPID REGURGITATION: ICD-10-CM

## 2023-04-19 DIAGNOSIS — Z95.2 H/O AORTIC VALVE REPLACEMENT: Primary | ICD-10-CM

## 2023-04-19 PROBLEM — R06.00 DYSPNEA: Status: ACTIVE | Noted: 2023-04-19

## 2023-04-19 LAB
ALBUMIN SERPL-MCNC: 3 G/DL (ref 3.2–4.6)
ALBUMIN/GLOB SERPL: 0.9 (ref 0.4–1.6)
ALP SERPL-CCNC: 102 U/L (ref 50–136)
ALT SERPL-CCNC: 25 U/L (ref 12–65)
ANION GAP SERPL CALC-SCNC: 4 MMOL/L (ref 2–11)
AST SERPL-CCNC: 33 U/L (ref 15–37)
BILIRUB SERPL-MCNC: 2.9 MG/DL (ref 0.2–1.1)
BUN SERPL-MCNC: 27 MG/DL (ref 8–23)
CALCIUM SERPL-MCNC: 9.1 MG/DL (ref 8.3–10.4)
CHLORIDE SERPL-SCNC: 109 MMOL/L (ref 101–110)
CO2 SERPL-SCNC: 23 MMOL/L (ref 21–32)
CREAT SERPL-MCNC: 1.4 MG/DL (ref 0.8–1.5)
ERYTHROCYTE [DISTWIDTH] IN BLOOD BY AUTOMATED COUNT: 19.7 % (ref 11.9–14.6)
GLOBULIN SER CALC-MCNC: 3.5 G/DL (ref 2.8–4.5)
GLUCOSE SERPL-MCNC: 105 MG/DL (ref 65–100)
HCT VFR BLD AUTO: 42.1 % (ref 41.1–50.3)
HGB BLD-MCNC: 13.6 G/DL (ref 13.6–17.2)
MAGNESIUM SERPL-MCNC: 2 MG/DL (ref 1.8–2.4)
MCH RBC QN AUTO: 31.5 PG (ref 26.1–32.9)
MCHC RBC AUTO-ENTMCNC: 32.3 G/DL (ref 31.4–35)
MCV RBC AUTO: 97.5 FL (ref 82–102)
NRBC # BLD: 0 K/UL (ref 0–0.2)
NT PRO BNP: 9771 PG/ML
PLATELET # BLD AUTO: 115 K/UL (ref 150–450)
PMV BLD AUTO: 11.7 FL (ref 9.4–12.3)
POC INR: 2.8
POTASSIUM SERPL-SCNC: 4.2 MMOL/L (ref 3.5–5.1)
PROT SERPL-MCNC: 6.5 G/DL (ref 6.3–8.2)
PROTHROMBIN TIME, POC: NORMAL
RBC # BLD AUTO: 4.32 M/UL (ref 4.23–5.6)
SODIUM SERPL-SCNC: 136 MMOL/L (ref 133–143)
WBC # BLD AUTO: 5 K/UL (ref 4.3–11.1)

## 2023-04-19 PROCEDURE — 93793 ANTICOAG MGMT PT WARFARIN: CPT | Performed by: INTERNAL MEDICINE

## 2023-04-19 PROCEDURE — G0378 HOSPITAL OBSERVATION PER HR: HCPCS

## 2023-04-19 PROCEDURE — 85610 PROTHROMBIN TIME: CPT | Performed by: INTERNAL MEDICINE

## 2023-04-19 PROCEDURE — 6370000000 HC RX 637 (ALT 250 FOR IP): Performed by: PHYSICIAN ASSISTANT

## 2023-04-19 PROCEDURE — 99223 1ST HOSP IP/OBS HIGH 75: CPT | Performed by: INTERNAL MEDICINE

## 2023-04-19 PROCEDURE — 83735 ASSAY OF MAGNESIUM: CPT

## 2023-04-19 PROCEDURE — 6360000002 HC RX W HCPCS: Performed by: PHYSICIAN ASSISTANT

## 2023-04-19 PROCEDURE — 85027 COMPLETE CBC AUTOMATED: CPT

## 2023-04-19 PROCEDURE — 96365 THER/PROPH/DIAG IV INF INIT: CPT

## 2023-04-19 PROCEDURE — 83880 ASSAY OF NATRIURETIC PEPTIDE: CPT

## 2023-04-19 PROCEDURE — G0379 DIRECT REFER HOSPITAL OBSERV: HCPCS

## 2023-04-19 PROCEDURE — 80053 COMPREHEN METABOLIC PANEL: CPT

## 2023-04-19 PROCEDURE — 2580000003 HC RX 258: Performed by: PHYSICIAN ASSISTANT

## 2023-04-19 PROCEDURE — 36415 COLL VENOUS BLD VENIPUNCTURE: CPT

## 2023-04-19 PROCEDURE — 6370000000 HC RX 637 (ALT 250 FOR IP): Performed by: NURSE PRACTITIONER

## 2023-04-19 PROCEDURE — 96366 THER/PROPH/DIAG IV INF ADDON: CPT

## 2023-04-19 PROCEDURE — 2500000003 HC RX 250 WO HCPCS: Performed by: PHYSICIAN ASSISTANT

## 2023-04-19 RX ORDER — ACETAMINOPHEN 325 MG/1
325 TABLET ORAL EVERY 4 HOURS PRN
Status: DISCONTINUED | OUTPATIENT
Start: 2023-04-19 | End: 2023-04-19 | Stop reason: SDUPTHER

## 2023-04-19 RX ORDER — DIGOXIN 125 MCG
0.12 TABLET ORAL DAILY
Status: DISCONTINUED | OUTPATIENT
Start: 2023-04-20 | End: 2023-04-24 | Stop reason: HOSPADM

## 2023-04-19 RX ORDER — SODIUM CHLORIDE 0.9 % (FLUSH) 0.9 %
5-40 SYRINGE (ML) INJECTION PRN
Status: DISCONTINUED | OUTPATIENT
Start: 2023-04-19 | End: 2023-04-24 | Stop reason: HOSPADM

## 2023-04-19 RX ORDER — ONDANSETRON 4 MG/1
4 TABLET, ORALLY DISINTEGRATING ORAL EVERY 8 HOURS PRN
Status: DISCONTINUED | OUTPATIENT
Start: 2023-04-19 | End: 2023-04-24 | Stop reason: HOSPADM

## 2023-04-19 RX ORDER — PRIMIDONE 50 MG/1
50 TABLET ORAL DAILY
Status: DISCONTINUED | OUTPATIENT
Start: 2023-04-19 | End: 2023-04-22

## 2023-04-19 RX ORDER — TAMSULOSIN HYDROCHLORIDE 0.4 MG/1
0.4 CAPSULE ORAL DAILY
Status: DISCONTINUED | OUTPATIENT
Start: 2023-04-19 | End: 2023-04-24 | Stop reason: HOSPADM

## 2023-04-19 RX ORDER — POTASSIUM CHLORIDE 750 MG/1
10 TABLET, EXTENDED RELEASE ORAL 2 TIMES DAILY WITH MEALS
Status: DISCONTINUED | OUTPATIENT
Start: 2023-04-19 | End: 2023-04-19

## 2023-04-19 RX ORDER — WARFARIN SODIUM 7.5 MG/1
7.5 TABLET ORAL
Status: DISCONTINUED | OUTPATIENT
Start: 2023-04-20 | End: 2023-04-24 | Stop reason: HOSPADM

## 2023-04-19 RX ORDER — WARFARIN SODIUM 5 MG/1
5 TABLET ORAL
Status: DISCONTINUED | OUTPATIENT
Start: 2023-04-19 | End: 2023-04-19

## 2023-04-19 RX ORDER — LOPERAMIDE HYDROCHLORIDE 2 MG/1
2 CAPSULE ORAL 4 TIMES DAILY PRN
Status: DISCONTINUED | OUTPATIENT
Start: 2023-04-19 | End: 2023-04-24 | Stop reason: HOSPADM

## 2023-04-19 RX ORDER — LISINOPRIL 5 MG/1
10 TABLET ORAL DAILY
Status: DISCONTINUED | OUTPATIENT
Start: 2023-04-20 | End: 2023-04-22

## 2023-04-19 RX ORDER — GABAPENTIN 300 MG/1
300 CAPSULE ORAL 3 TIMES DAILY
Status: DISCONTINUED | OUTPATIENT
Start: 2023-04-19 | End: 2023-04-22

## 2023-04-19 RX ORDER — METOPROLOL SUCCINATE 100 MG/1
100 TABLET, EXTENDED RELEASE ORAL DAILY
Status: DISCONTINUED | OUTPATIENT
Start: 2023-04-20 | End: 2023-04-20

## 2023-04-19 RX ORDER — ATORVASTATIN CALCIUM 40 MG/1
40 TABLET, FILM COATED ORAL NIGHTLY
Status: DISCONTINUED | OUTPATIENT
Start: 2023-04-19 | End: 2023-04-24 | Stop reason: HOSPADM

## 2023-04-19 RX ORDER — WARFARIN SODIUM 7.5 MG/1
7.5 TABLET ORAL
Status: DISCONTINUED | OUTPATIENT
Start: 2023-04-20 | End: 2023-04-19

## 2023-04-19 RX ORDER — ONDANSETRON 2 MG/ML
4 INJECTION INTRAMUSCULAR; INTRAVENOUS EVERY 6 HOURS PRN
Status: DISCONTINUED | OUTPATIENT
Start: 2023-04-19 | End: 2023-04-24 | Stop reason: HOSPADM

## 2023-04-19 RX ORDER — ACETAMINOPHEN 325 MG/1
650 TABLET ORAL EVERY 6 HOURS PRN
Status: DISCONTINUED | OUTPATIENT
Start: 2023-04-19 | End: 2023-04-24 | Stop reason: HOSPADM

## 2023-04-19 RX ORDER — WARFARIN SODIUM 5 MG/1
5 TABLET ORAL
Status: DISCONTINUED | OUTPATIENT
Start: 2023-04-19 | End: 2023-04-24 | Stop reason: HOSPADM

## 2023-04-19 RX ORDER — SODIUM CHLORIDE 0.9 % (FLUSH) 0.9 %
5-40 SYRINGE (ML) INJECTION EVERY 12 HOURS SCHEDULED
Status: DISCONTINUED | OUTPATIENT
Start: 2023-04-19 | End: 2023-04-24 | Stop reason: HOSPADM

## 2023-04-19 RX ORDER — SODIUM CHLORIDE 9 MG/ML
INJECTION, SOLUTION INTRAVENOUS PRN
Status: DISCONTINUED | OUTPATIENT
Start: 2023-04-19 | End: 2023-04-24 | Stop reason: HOSPADM

## 2023-04-19 RX ORDER — POLYETHYLENE GLYCOL 3350 17 G/17G
17 POWDER, FOR SOLUTION ORAL DAILY PRN
Status: DISCONTINUED | OUTPATIENT
Start: 2023-04-19 | End: 2023-04-24 | Stop reason: HOSPADM

## 2023-04-19 RX ADMIN — GABAPENTIN 300 MG: 300 CAPSULE ORAL at 14:49

## 2023-04-19 RX ADMIN — FUROSEMIDE 10 MG/HR: 10 INJECTION, SOLUTION INTRAMUSCULAR; INTRAVENOUS at 22:49

## 2023-04-19 RX ADMIN — SODIUM CHLORIDE, PRESERVATIVE FREE 5 ML: 5 INJECTION INTRAVENOUS at 20:08

## 2023-04-19 RX ADMIN — ATORVASTATIN CALCIUM 40 MG: 40 TABLET, FILM COATED ORAL at 20:06

## 2023-04-19 RX ADMIN — TAMSULOSIN HYDROCHLORIDE 0.4 MG: 0.4 CAPSULE ORAL at 12:47

## 2023-04-19 RX ADMIN — TUBERCULIN PURIFIED PROTEIN DERIVATIVE 5 UNITS: 5 INJECTION, SOLUTION INTRADERMAL at 12:42

## 2023-04-19 RX ADMIN — SODIUM CHLORIDE, PRESERVATIVE FREE 5 ML: 5 INJECTION INTRAVENOUS at 12:47

## 2023-04-19 RX ADMIN — LOPERAMIDE HYDROCHLORIDE 2 MG: 2 CAPSULE ORAL at 20:06

## 2023-04-19 RX ADMIN — FUROSEMIDE 10 MG/HR: 10 INJECTION, SOLUTION INTRAMUSCULAR; INTRAVENOUS at 12:42

## 2023-04-19 RX ADMIN — GABAPENTIN 300 MG: 300 CAPSULE ORAL at 20:06

## 2023-04-19 ASSESSMENT — ENCOUNTER SYMPTOMS
SHORTNESS OF BREATH: 1
DIARRHEA: 0
LEFT EYE: 0
COLOR CHANGE: 0

## 2023-04-19 NOTE — PROGRESS NOTES
the HPI, past history and data review sections of this note     EKG done today and reviewed with patient showed:  Atrial fibrillation  -Right bundle branch block and right axis -possible right ventricular hypertrophy  -consider pulmonary disease or posterior fasicular block. Rate 109    ASSESSMENT and PLAN      1. Atherosclerosis of native coronary artery of native heart without angina pectoris  No angina.  - EKG 12 Lead    2. Acute systolic congestive heart failure (HCC)  Severely volume overloaded. Appears debilitated. Will admit to the hospital for IV Lasix drip. PT/OT consult. Unfortunately appears the majority of his symptoms are likely related to severe tricuspid regurgitation RV dysfunction. Overall poor prognosis but patient has had a good quality of life up until this point. Attempt to stabilize with diuresis. 3. H/O aortic valve replacement  Mild to moderate aortic regurgitation based on my review of echo. 4. Severe tricuspid regurgitation  Primary issue is severe TR and right-sided heart failure. Overall poor prognosis but will attempt diuresis. Monitor renal function closely. 5.  Atrial fibrillation: Mildly tachycardic today. Chronic. Continue Toprol and Coumadin. Monitor on telemetry. No follow-ups on file. Wilma Messina MD  4/19/2023  9:42 AM    This note may have been dictated using speech recognition software.   As a result, error of speech recognition may have occurred

## 2023-04-19 NOTE — PROGRESS NOTES
Anticoagulation Summary  As of 4/19/2023      INR goal:  2.0-3.0   TTR:  63.2 % (10.7 mo)   INR used for dosing:     Warfarin maintenance plan:  5 mg (5 mg x 1) every Mon, Wed, Fri; 7.5 mg (7.5 mg x 1) all other days   Weekly warfarin total:  45 mg   Plan last modified:  Gabbie Gomez MA (12/29/2022)   Next INR check:  5/3/2023   Target end date:   Indefinite    Indications    H/O aortic valve replacement [Z95.2]  Longstanding persistent atrial fibrillation ([facility] [address] [I48.11]  Long term (current) use of anticoagulants [Z79.01]                 Anticoagulation Episode Summary       INR check location:  Anticoagulation Clinic    Preferred lab:      Send INR reminders to:  [address] [facility] [address] [facility] [address] PT    Comments:            Anticoagulation Care Providers       Provider Role Specialty Phone number    Charlene Diehl MD Responsible Cardiology [phone]

## 2023-04-19 NOTE — PATIENT INSTRUCTIONS
Reminder: Please contact the Coumadin Clinic at [phone]  when you have medication changes. Examples, new medications, antibiotics, discontinued medications, new supplements, missed doses of warfarin or if you took extra doses of warfarin. This also includes OTC medications. Notifying us helps reduce the possibility of high and low INR's. In addition, if warfarin needs to be held for any procedures, please have surgeon or physician's office contact us before holding anticoagulant. Thanks, [facility]iology Coumadin Clinic.

## 2023-04-20 LAB
ANION GAP SERPL CALC-SCNC: 5 MMOL/L (ref 2–11)
BUN SERPL-MCNC: 29 MG/DL (ref 8–23)
CALCIUM SERPL-MCNC: 9.1 MG/DL (ref 8.3–10.4)
CHLORIDE SERPL-SCNC: 106 MMOL/L (ref 101–110)
CO2 SERPL-SCNC: 25 MMOL/L (ref 21–32)
CREAT SERPL-MCNC: 1.5 MG/DL (ref 0.8–1.5)
GLUCOSE SERPL-MCNC: 109 MG/DL (ref 65–100)
INR PPP: 2.4
MAGNESIUM SERPL-MCNC: 1.9 MG/DL (ref 1.8–2.4)
MM INDURATION, POC: 0 MM (ref 0–5)
POTASSIUM SERPL-SCNC: 4 MMOL/L (ref 3.5–5.1)
PPD, POC: NEGATIVE
PROTHROMBIN TIME: 26.4 SEC (ref 12.6–14.3)
SODIUM SERPL-SCNC: 136 MMOL/L (ref 133–143)

## 2023-04-20 PROCEDURE — 6370000000 HC RX 637 (ALT 250 FOR IP): Performed by: INTERNAL MEDICINE

## 2023-04-20 PROCEDURE — 85610 PROTHROMBIN TIME: CPT

## 2023-04-20 PROCEDURE — 36415 COLL VENOUS BLD VENIPUNCTURE: CPT

## 2023-04-20 PROCEDURE — 97112 NEUROMUSCULAR REEDUCATION: CPT

## 2023-04-20 PROCEDURE — 97165 OT EVAL LOW COMPLEX 30 MIN: CPT

## 2023-04-20 PROCEDURE — 97530 THERAPEUTIC ACTIVITIES: CPT

## 2023-04-20 PROCEDURE — 80048 BASIC METABOLIC PNL TOTAL CA: CPT

## 2023-04-20 PROCEDURE — 1100000003 HC PRIVATE W/ TELEMETRY

## 2023-04-20 PROCEDURE — 83735 ASSAY OF MAGNESIUM: CPT

## 2023-04-20 PROCEDURE — 97161 PT EVAL LOW COMPLEX 20 MIN: CPT

## 2023-04-20 PROCEDURE — 99232 SBSQ HOSP IP/OBS MODERATE 35: CPT | Performed by: INTERNAL MEDICINE

## 2023-04-20 PROCEDURE — 97535 SELF CARE MNGMENT TRAINING: CPT

## 2023-04-20 PROCEDURE — 2580000003 HC RX 258: Performed by: PHYSICIAN ASSISTANT

## 2023-04-20 PROCEDURE — 96366 THER/PROPH/DIAG IV INF ADDON: CPT

## 2023-04-20 PROCEDURE — 6370000000 HC RX 637 (ALT 250 FOR IP): Performed by: PHYSICIAN ASSISTANT

## 2023-04-20 PROCEDURE — 6360000002 HC RX W HCPCS: Performed by: PHYSICIAN ASSISTANT

## 2023-04-20 RX ORDER — SPIRONOLACTONE 25 MG/1
25 TABLET ORAL DAILY
Status: DISCONTINUED | OUTPATIENT
Start: 2023-04-20 | End: 2023-04-24 | Stop reason: HOSPADM

## 2023-04-20 RX ORDER — METOPROLOL SUCCINATE 100 MG/1
100 TABLET, EXTENDED RELEASE ORAL 2 TIMES DAILY
Status: DISCONTINUED | OUTPATIENT
Start: 2023-04-20 | End: 2023-04-22

## 2023-04-20 RX ADMIN — METOPROLOL SUCCINATE 100 MG: 100 TABLET, EXTENDED RELEASE ORAL at 08:45

## 2023-04-20 RX ADMIN — SODIUM CHLORIDE, PRESERVATIVE FREE 10 ML: 5 INJECTION INTRAVENOUS at 20:37

## 2023-04-20 RX ADMIN — EMPAGLIFLOZIN 10 MG: 10 TABLET, FILM COATED ORAL at 08:45

## 2023-04-20 RX ADMIN — DIGOXIN 0.12 MG: 125 TABLET ORAL at 08:45

## 2023-04-20 RX ADMIN — TAMSULOSIN HYDROCHLORIDE 0.4 MG: 0.4 CAPSULE ORAL at 08:45

## 2023-04-20 RX ADMIN — PRIMIDONE 50 MG: 50 TABLET ORAL at 08:46

## 2023-04-20 RX ADMIN — ATORVASTATIN CALCIUM 40 MG: 40 TABLET, FILM COATED ORAL at 20:37

## 2023-04-20 RX ADMIN — METOPROLOL SUCCINATE 100 MG: 100 TABLET, EXTENDED RELEASE ORAL at 20:37

## 2023-04-20 RX ADMIN — SODIUM CHLORIDE, PRESERVATIVE FREE 10 ML: 5 INJECTION INTRAVENOUS at 08:46

## 2023-04-20 RX ADMIN — WARFARIN SODIUM 7.5 MG: 7.5 TABLET ORAL at 17:44

## 2023-04-20 RX ADMIN — GABAPENTIN 300 MG: 300 CAPSULE ORAL at 14:34

## 2023-04-20 RX ADMIN — GABAPENTIN 300 MG: 300 CAPSULE ORAL at 20:37

## 2023-04-20 RX ADMIN — GABAPENTIN 300 MG: 300 CAPSULE ORAL at 08:45

## 2023-04-20 RX ADMIN — FUROSEMIDE 10 MG/HR: 10 INJECTION, SOLUTION INTRAMUSCULAR; INTRAVENOUS at 11:41

## 2023-04-20 RX ADMIN — SPIRONOLACTONE 25 MG: 25 TABLET ORAL at 08:46

## 2023-04-20 RX ADMIN — LISINOPRIL 10 MG: 5 TABLET ORAL at 08:46

## 2023-04-21 LAB
ANION GAP SERPL CALC-SCNC: 4 MMOL/L (ref 2–11)
BUN SERPL-MCNC: 33 MG/DL (ref 8–23)
CALCIUM SERPL-MCNC: 9.9 MG/DL (ref 8.3–10.4)
CHLORIDE SERPL-SCNC: 101 MMOL/L (ref 101–110)
CO2 SERPL-SCNC: 29 MMOL/L (ref 21–32)
CREAT SERPL-MCNC: 1.4 MG/DL (ref 0.8–1.5)
GLUCOSE SERPL-MCNC: 101 MG/DL (ref 65–100)
INR PPP: 2.4
MAGNESIUM SERPL-MCNC: 1.9 MG/DL (ref 1.8–2.4)
MM INDURATION, POC: 0 MM (ref 0–5)
POTASSIUM SERPL-SCNC: 3.9 MMOL/L (ref 3.5–5.1)
PPD, POC: NEGATIVE
PROTHROMBIN TIME: 26.9 SEC (ref 12.6–14.3)
SODIUM SERPL-SCNC: 134 MMOL/L (ref 133–143)

## 2023-04-21 PROCEDURE — 1100000003 HC PRIVATE W/ TELEMETRY

## 2023-04-21 PROCEDURE — 6360000002 HC RX W HCPCS: Performed by: PHYSICIAN ASSISTANT

## 2023-04-21 PROCEDURE — 36415 COLL VENOUS BLD VENIPUNCTURE: CPT

## 2023-04-21 PROCEDURE — 80048 BASIC METABOLIC PNL TOTAL CA: CPT

## 2023-04-21 PROCEDURE — 99232 SBSQ HOSP IP/OBS MODERATE 35: CPT | Performed by: INTERNAL MEDICINE

## 2023-04-21 PROCEDURE — 6370000000 HC RX 637 (ALT 250 FOR IP): Performed by: PHYSICIAN ASSISTANT

## 2023-04-21 PROCEDURE — 85610 PROTHROMBIN TIME: CPT

## 2023-04-21 PROCEDURE — 6370000000 HC RX 637 (ALT 250 FOR IP): Performed by: INTERNAL MEDICINE

## 2023-04-21 PROCEDURE — 2580000003 HC RX 258: Performed by: PHYSICIAN ASSISTANT

## 2023-04-21 PROCEDURE — 83735 ASSAY OF MAGNESIUM: CPT

## 2023-04-21 PROCEDURE — 2580000003 HC RX 258: Performed by: NURSE PRACTITIONER

## 2023-04-21 RX ORDER — 0.9 % SODIUM CHLORIDE 0.9 %
250 INTRAVENOUS SOLUTION INTRAVENOUS ONCE
Status: COMPLETED | OUTPATIENT
Start: 2023-04-21 | End: 2023-04-21

## 2023-04-21 RX ADMIN — SODIUM CHLORIDE, PRESERVATIVE FREE 10 ML: 5 INJECTION INTRAVENOUS at 08:11

## 2023-04-21 RX ADMIN — SODIUM CHLORIDE, PRESERVATIVE FREE 5 ML: 5 INJECTION INTRAVENOUS at 20:56

## 2023-04-21 RX ADMIN — METOPROLOL SUCCINATE 100 MG: 100 TABLET, EXTENDED RELEASE ORAL at 08:10

## 2023-04-21 RX ADMIN — FUROSEMIDE 10 MG/HR: 10 INJECTION, SOLUTION INTRAMUSCULAR; INTRAVENOUS at 00:47

## 2023-04-21 RX ADMIN — PRIMIDONE 50 MG: 50 TABLET ORAL at 08:11

## 2023-04-21 RX ADMIN — EMPAGLIFLOZIN 10 MG: 10 TABLET, FILM COATED ORAL at 08:11

## 2023-04-21 RX ADMIN — ACETAMINOPHEN 650 MG: 325 TABLET ORAL at 00:58

## 2023-04-21 RX ADMIN — WARFARIN SODIUM 5 MG: 5 TABLET ORAL at 18:22

## 2023-04-21 RX ADMIN — SODIUM CHLORIDE 250 ML: 9 INJECTION, SOLUTION INTRAVENOUS at 12:00

## 2023-04-21 RX ADMIN — ACETAMINOPHEN 650 MG: 325 TABLET ORAL at 11:18

## 2023-04-21 RX ADMIN — DIGOXIN 0.12 MG: 125 TABLET ORAL at 08:10

## 2023-04-21 RX ADMIN — GABAPENTIN 300 MG: 300 CAPSULE ORAL at 08:10

## 2023-04-21 RX ADMIN — SPIRONOLACTONE 25 MG: 25 TABLET ORAL at 08:10

## 2023-04-21 RX ADMIN — SODIUM CHLORIDE 250 ML: 9 INJECTION, SOLUTION INTRAVENOUS at 11:15

## 2023-04-21 RX ADMIN — TAMSULOSIN HYDROCHLORIDE 0.4 MG: 0.4 CAPSULE ORAL at 08:10

## 2023-04-21 RX ADMIN — LISINOPRIL 10 MG: 5 TABLET ORAL at 08:11

## 2023-04-21 RX ADMIN — GABAPENTIN 300 MG: 300 CAPSULE ORAL at 20:56

## 2023-04-21 RX ADMIN — ATORVASTATIN CALCIUM 40 MG: 40 TABLET, FILM COATED ORAL at 20:56

## 2023-04-21 ASSESSMENT — PAIN DESCRIPTION - LOCATION
LOCATION: LEG
LOCATION: HAND;LEG

## 2023-04-21 ASSESSMENT — PAIN DESCRIPTION - DESCRIPTORS
DESCRIPTORS: CRAMPING
DESCRIPTORS: CRAMPING

## 2023-04-21 ASSESSMENT — PAIN SCALES - GENERAL
PAINLEVEL_OUTOF10: 0
PAINLEVEL_OUTOF10: 3
PAINLEVEL_OUTOF10: 0
PAINLEVEL_OUTOF10: 0
PAINLEVEL_OUTOF10: 3
PAINLEVEL_OUTOF10: 0

## 2023-04-21 ASSESSMENT — PAIN DESCRIPTION - ORIENTATION
ORIENTATION: RIGHT;LEFT
ORIENTATION: RIGHT;LEFT

## 2023-04-21 ASSESSMENT — PAIN - FUNCTIONAL ASSESSMENT: PAIN_FUNCTIONAL_ASSESSMENT: ACTIVITIES ARE NOT PREVENTED

## 2023-04-22 PROBLEM — E11.9 CONTROLLED TYPE 2 DIABETES MELLITUS WITHOUT COMPLICATION, WITHOUT LONG-TERM CURRENT USE OF INSULIN (HCC): Status: ACTIVE | Noted: 2020-07-15

## 2023-04-22 PROBLEM — E87.70 VOLUME OVERLOAD: Chronic | Status: ACTIVE | Noted: 2023-04-22

## 2023-04-22 LAB
ANION GAP SERPL CALC-SCNC: 5 MMOL/L (ref 2–11)
BUN SERPL-MCNC: 37 MG/DL (ref 8–23)
CALCIUM SERPL-MCNC: 9.3 MG/DL (ref 8.3–10.4)
CHLORIDE SERPL-SCNC: 99 MMOL/L (ref 101–110)
CO2 SERPL-SCNC: 29 MMOL/L (ref 21–32)
CREAT SERPL-MCNC: 1.6 MG/DL (ref 0.8–1.5)
GLUCOSE SERPL-MCNC: 104 MG/DL (ref 65–100)
INR PPP: 2.5
MAGNESIUM SERPL-MCNC: 1.9 MG/DL (ref 1.8–2.4)
POTASSIUM SERPL-SCNC: 4.2 MMOL/L (ref 3.5–5.1)
PROTHROMBIN TIME: 27.8 SEC (ref 12.6–14.3)
SODIUM SERPL-SCNC: 133 MMOL/L (ref 133–143)

## 2023-04-22 PROCEDURE — 6370000000 HC RX 637 (ALT 250 FOR IP): Performed by: PHYSICIAN ASSISTANT

## 2023-04-22 PROCEDURE — 36415 COLL VENOUS BLD VENIPUNCTURE: CPT

## 2023-04-22 PROCEDURE — 1100000003 HC PRIVATE W/ TELEMETRY

## 2023-04-22 PROCEDURE — 6370000000 HC RX 637 (ALT 250 FOR IP): Performed by: INTERNAL MEDICINE

## 2023-04-22 PROCEDURE — 2500000003 HC RX 250 WO HCPCS: Performed by: INTERNAL MEDICINE

## 2023-04-22 PROCEDURE — 85610 PROTHROMBIN TIME: CPT

## 2023-04-22 PROCEDURE — 99232 SBSQ HOSP IP/OBS MODERATE 35: CPT | Performed by: INTERNAL MEDICINE

## 2023-04-22 PROCEDURE — 83735 ASSAY OF MAGNESIUM: CPT

## 2023-04-22 PROCEDURE — 2580000003 HC RX 258: Performed by: PHYSICIAN ASSISTANT

## 2023-04-22 PROCEDURE — 80048 BASIC METABOLIC PNL TOTAL CA: CPT

## 2023-04-22 RX ORDER — LISINOPRIL 5 MG/1
5 TABLET ORAL DAILY
Status: DISCONTINUED | OUTPATIENT
Start: 2023-04-23 | End: 2023-04-24 | Stop reason: HOSPADM

## 2023-04-22 RX ORDER — METOPROLOL SUCCINATE 25 MG/1
50 TABLET, EXTENDED RELEASE ORAL 2 TIMES DAILY
Status: DISCONTINUED | OUTPATIENT
Start: 2023-04-22 | End: 2023-04-24 | Stop reason: HOSPADM

## 2023-04-22 RX ADMIN — WARFARIN SODIUM 7.5 MG: 7.5 TABLET ORAL at 20:56

## 2023-04-22 RX ADMIN — TUBERCULIN PURIFIED PROTEIN DERIVATIVE 5 UNITS: 5 INJECTION, SOLUTION INTRADERMAL at 13:24

## 2023-04-22 RX ADMIN — SPIRONOLACTONE 25 MG: 25 TABLET ORAL at 09:50

## 2023-04-22 RX ADMIN — SODIUM CHLORIDE, PRESERVATIVE FREE 10 ML: 5 INJECTION INTRAVENOUS at 09:55

## 2023-04-22 RX ADMIN — METOPROLOL SUCCINATE 50 MG: 25 TABLET, EXTENDED RELEASE ORAL at 20:57

## 2023-04-22 RX ADMIN — PRIMIDONE 50 MG: 50 TABLET ORAL at 09:53

## 2023-04-22 RX ADMIN — LISINOPRIL 10 MG: 5 TABLET ORAL at 09:54

## 2023-04-22 RX ADMIN — DIGOXIN 0.12 MG: 125 TABLET ORAL at 09:53

## 2023-04-22 RX ADMIN — TAMSULOSIN HYDROCHLORIDE 0.4 MG: 0.4 CAPSULE ORAL at 09:53

## 2023-04-22 RX ADMIN — ATORVASTATIN CALCIUM 40 MG: 40 TABLET, FILM COATED ORAL at 20:57

## 2023-04-22 RX ADMIN — SODIUM CHLORIDE, PRESERVATIVE FREE 5 ML: 5 INJECTION INTRAVENOUS at 20:58

## 2023-04-22 RX ADMIN — EMPAGLIFLOZIN 10 MG: 10 TABLET, FILM COATED ORAL at 09:53

## 2023-04-22 ASSESSMENT — PAIN SCALES - GENERAL
PAINLEVEL_OUTOF10: 0

## 2023-04-23 LAB
ANION GAP SERPL CALC-SCNC: 3 MMOL/L (ref 2–11)
BUN SERPL-MCNC: 36 MG/DL (ref 8–23)
CALCIUM SERPL-MCNC: 8.7 MG/DL (ref 8.3–10.4)
CHLORIDE SERPL-SCNC: 102 MMOL/L (ref 101–110)
CO2 SERPL-SCNC: 28 MMOL/L (ref 21–32)
CREAT SERPL-MCNC: 1.4 MG/DL (ref 0.8–1.5)
GLUCOSE SERPL-MCNC: 103 MG/DL (ref 65–100)
INR PPP: 2.4
MAGNESIUM SERPL-MCNC: 2.1 MG/DL (ref 1.8–2.4)
MM INDURATION, POC: 0 MM (ref 0–5)
POTASSIUM SERPL-SCNC: 4.5 MMOL/L (ref 3.5–5.1)
PPD, POC: NEGATIVE
PROTHROMBIN TIME: 26.9 SEC (ref 12.6–14.3)
SODIUM SERPL-SCNC: 133 MMOL/L (ref 133–143)

## 2023-04-23 PROCEDURE — 6370000000 HC RX 637 (ALT 250 FOR IP): Performed by: INTERNAL MEDICINE

## 2023-04-23 PROCEDURE — 1100000003 HC PRIVATE W/ TELEMETRY

## 2023-04-23 PROCEDURE — 99232 SBSQ HOSP IP/OBS MODERATE 35: CPT | Performed by: INTERNAL MEDICINE

## 2023-04-23 PROCEDURE — 6370000000 HC RX 637 (ALT 250 FOR IP): Performed by: PHYSICIAN ASSISTANT

## 2023-04-23 PROCEDURE — 85610 PROTHROMBIN TIME: CPT

## 2023-04-23 PROCEDURE — 83735 ASSAY OF MAGNESIUM: CPT

## 2023-04-23 PROCEDURE — 36415 COLL VENOUS BLD VENIPUNCTURE: CPT

## 2023-04-23 PROCEDURE — 80048 BASIC METABOLIC PNL TOTAL CA: CPT

## 2023-04-23 PROCEDURE — 2580000003 HC RX 258: Performed by: PHYSICIAN ASSISTANT

## 2023-04-23 RX ORDER — FUROSEMIDE 40 MG/1
40 TABLET ORAL 2 TIMES DAILY
Status: DISCONTINUED | OUTPATIENT
Start: 2023-04-23 | End: 2023-04-24 | Stop reason: HOSPADM

## 2023-04-23 RX ADMIN — METOPROLOL SUCCINATE 50 MG: 25 TABLET, EXTENDED RELEASE ORAL at 21:11

## 2023-04-23 RX ADMIN — LISINOPRIL 5 MG: 5 TABLET ORAL at 10:42

## 2023-04-23 RX ADMIN — WARFARIN SODIUM 7.5 MG: 7.5 TABLET ORAL at 18:28

## 2023-04-23 RX ADMIN — FUROSEMIDE 40 MG: 40 TABLET ORAL at 21:12

## 2023-04-23 RX ADMIN — EMPAGLIFLOZIN 10 MG: 10 TABLET, FILM COATED ORAL at 10:42

## 2023-04-23 RX ADMIN — ATORVASTATIN CALCIUM 40 MG: 40 TABLET, FILM COATED ORAL at 21:12

## 2023-04-23 RX ADMIN — FUROSEMIDE 40 MG: 40 TABLET ORAL at 12:48

## 2023-04-23 RX ADMIN — DIGOXIN 0.12 MG: 125 TABLET ORAL at 10:42

## 2023-04-23 RX ADMIN — SODIUM CHLORIDE, PRESERVATIVE FREE 5 ML: 5 INJECTION INTRAVENOUS at 10:43

## 2023-04-23 RX ADMIN — SODIUM CHLORIDE, PRESERVATIVE FREE 5 ML: 5 INJECTION INTRAVENOUS at 21:15

## 2023-04-23 RX ADMIN — METOPROLOL SUCCINATE 50 MG: 25 TABLET, EXTENDED RELEASE ORAL at 10:42

## 2023-04-23 RX ADMIN — SPIRONOLACTONE 25 MG: 25 TABLET ORAL at 10:42

## 2023-04-23 RX ADMIN — TAMSULOSIN HYDROCHLORIDE 0.4 MG: 0.4 CAPSULE ORAL at 10:42

## 2023-04-23 ASSESSMENT — PAIN SCALES - GENERAL
PAINLEVEL_OUTOF10: 0
PAINLEVEL_OUTOF10: 0

## 2023-04-24 VITALS
WEIGHT: 161.6 LBS | OXYGEN SATURATION: 99 % | DIASTOLIC BLOOD PRESSURE: 56 MMHG | TEMPERATURE: 98.1 F | HEART RATE: 76 BPM | HEIGHT: 72 IN | SYSTOLIC BLOOD PRESSURE: 98 MMHG | BODY MASS INDEX: 21.89 KG/M2 | RESPIRATION RATE: 16 BRPM

## 2023-04-24 LAB
ANION GAP SERPL CALC-SCNC: 3 MMOL/L (ref 2–11)
BUN SERPL-MCNC: 37 MG/DL (ref 8–23)
CALCIUM SERPL-MCNC: 8.9 MG/DL (ref 8.3–10.4)
CHLORIDE SERPL-SCNC: 100 MMOL/L (ref 101–110)
CO2 SERPL-SCNC: 30 MMOL/L (ref 21–32)
CREAT SERPL-MCNC: 1.6 MG/DL (ref 0.8–1.5)
GLUCOSE SERPL-MCNC: 107 MG/DL (ref 65–100)
INR PPP: 2.6
MAGNESIUM SERPL-MCNC: 2.2 MG/DL (ref 1.8–2.4)
MM INDURATION, POC: 0 MM (ref 0–5)
POTASSIUM SERPL-SCNC: 5.3 MMOL/L (ref 3.5–5.1)
PPD, POC: NEGATIVE
PROTHROMBIN TIME: 28.6 SEC (ref 12.6–14.3)
SODIUM SERPL-SCNC: 133 MMOL/L (ref 133–143)

## 2023-04-24 PROCEDURE — 83735 ASSAY OF MAGNESIUM: CPT

## 2023-04-24 PROCEDURE — 97530 THERAPEUTIC ACTIVITIES: CPT

## 2023-04-24 PROCEDURE — 80048 BASIC METABOLIC PNL TOTAL CA: CPT

## 2023-04-24 PROCEDURE — 36415 COLL VENOUS BLD VENIPUNCTURE: CPT

## 2023-04-24 PROCEDURE — 99238 HOSP IP/OBS DSCHRG MGMT 30/<: CPT | Performed by: INTERNAL MEDICINE

## 2023-04-24 PROCEDURE — 6370000000 HC RX 637 (ALT 250 FOR IP): Performed by: INTERNAL MEDICINE

## 2023-04-24 PROCEDURE — 6370000000 HC RX 637 (ALT 250 FOR IP): Performed by: PHYSICIAN ASSISTANT

## 2023-04-24 PROCEDURE — 85610 PROTHROMBIN TIME: CPT

## 2023-04-24 PROCEDURE — 2580000003 HC RX 258: Performed by: PHYSICIAN ASSISTANT

## 2023-04-24 RX ORDER — WARFARIN SODIUM 7.5 MG/1
7.5 TABLET ORAL
Qty: 30 TABLET | Refills: 3
Start: 2023-04-25

## 2023-04-24 RX ORDER — FUROSEMIDE 40 MG/1
40 TABLET ORAL 2 TIMES DAILY
Qty: 90 TABLET | Refills: 3 | Status: SHIPPED | OUTPATIENT
Start: 2023-04-24

## 2023-04-24 RX ORDER — SPIRONOLACTONE 25 MG/1
25 TABLET ORAL DAILY
Qty: 30 TABLET | Refills: 5 | Status: SHIPPED | OUTPATIENT
Start: 2023-04-25

## 2023-04-24 RX ORDER — METOPROLOL SUCCINATE 50 MG/1
50 TABLET, EXTENDED RELEASE ORAL 2 TIMES DAILY
Qty: 60 TABLET | Refills: 5 | Status: SHIPPED | OUTPATIENT
Start: 2023-04-24

## 2023-04-24 RX ORDER — WARFARIN SODIUM 5 MG/1
5 TABLET ORAL
Qty: 30 TABLET | Refills: 3
Start: 2023-04-24

## 2023-04-24 RX ORDER — LISINOPRIL 5 MG/1
5 TABLET ORAL DAILY
Qty: 30 TABLET | Refills: 5 | Status: SHIPPED | OUTPATIENT
Start: 2023-04-24

## 2023-04-24 RX ADMIN — TAMSULOSIN HYDROCHLORIDE 0.4 MG: 0.4 CAPSULE ORAL at 09:06

## 2023-04-24 RX ADMIN — DIGOXIN 0.12 MG: 125 TABLET ORAL at 09:00

## 2023-04-24 RX ADMIN — FUROSEMIDE 40 MG: 40 TABLET ORAL at 09:00

## 2023-04-24 RX ADMIN — SPIRONOLACTONE 25 MG: 25 TABLET ORAL at 09:00

## 2023-04-24 RX ADMIN — EMPAGLIFLOZIN 10 MG: 10 TABLET, FILM COATED ORAL at 09:00

## 2023-04-24 RX ADMIN — METOPROLOL SUCCINATE 50 MG: 25 TABLET, EXTENDED RELEASE ORAL at 09:00

## 2023-04-24 RX ADMIN — SODIUM CHLORIDE, PRESERVATIVE FREE 5 ML: 5 INJECTION INTRAVENOUS at 09:02

## 2023-04-24 RX ADMIN — LISINOPRIL 5 MG: 5 TABLET ORAL at 09:00

## 2023-04-24 ASSESSMENT — PAIN SCALES - GENERAL
PAINLEVEL_OUTOF10: 0

## 2023-04-24 NOTE — DISCHARGE SUMMARY
[address] [facility] Cardiology Discharge Summary     Patient ID:  Luis Manuel Fabian  [id number]  47 y.o.  6/26/1934    Admit date: 4/19/2023    Discharge date:  04/24/2023    Admitting Physician: Corey Corwell MD     Discharge Physician: Marques Toscano, EMILY - LILIYA/Dr. Sylvester Good    Admission Diagnoses: Dyspnea [R06.00]    Discharge Diagnoses:   Patient Active Problem List    Diagnosis    Volume overload    Dyspnea    Benign prostatic hyperplasia with urinary frequency    Chronic systolic congestive heart failure ([address]    Controlled type 2 diabetes mellitus without complication, without long-term current use of insulin (HCC)    Longstanding persistent atrial fibrillation (HCC)    H/O aortic valve replacement    Low back pain    Hyperlipidemia    Essential hypertension    Atherosclerosis of native coronary artery of native heart without angina pectoris    Carotid artery stenosis without cerebral infarction       Cardiology Procedures this admission:   None  Consults: PT/OT    Hospital Course: Patient presented to the office  with complaints of marked lower extremity edema including scrotal swelling. He admitted to only taking lasix about 3 times a week. Patient was evaluated and subsequently admitted for further cardiac evaluation and treatment. Patient was  diuresed with IV lasix drip and was transitioned to PO prior to discharge. He was -17.4L at discharge. PT/OT was consulted and recommended home health. INR was 2.8 at time of discharge. Patient will follow up with the coumadin clinic on 5/3/2023. At time of discharge, patient was up feeling well without any complaints shortness of breath. LE edema was much improved. Patient's labs were stable with creatinine of 1.60. Patient was seen and examined by Dr. Sylvester Good and determined stable and ready for discharge.  Patient was instructed on the importance of medication compliance, low sodium diet, 2 liter per day fluid restriction and daily

## 2023-04-24 NOTE — CARE COORDINATION
CM met with patient after PT finished therapy session. Patient walking in the villarreal then up in chair. Plan for discharge today. CM contacted patient's son, Eloisa Morales [phone] to inform of patient's discharge order. CM informed Mau of patient's verbalized concern for his car which was [facility] at [facility] when patient was admitted after his appointment. Eloisa Morales reports patient's car has been retrieved and will be taken back to his home. Eloisa Morales reports he has not spoken to a Dr regarding patient's status. CM will attempt for family to speak with nurse extender prior to discharge. CM discussed home health at discharge. Patient verbalized agreement. CM will speak with patient and family for preference for home health care when family arrives to transport home today. Mau reports family will come this afternoon to transport patient home. Addendum: [id number]   CM received a call back from [place] voicing concern of discharge plan. CM discussed plan for setting up home health care and Meals on Wheels. Both verbalize agreement with the plan. Main concern voiced is patient's home location is 45 min from their home. [facility] reports they have tried to get patient to consider shelter but patient has declined. CM contacted [employer] in [place] for assistance with setting up Meals on Wheels. CM spoke with Deedee Shield who offered contact to be [employer] [phone]. JACIEL LVOM for [email or web address]. CM informed by Deedee Chau that home assistance ( light housekeeping, ADL assistance) is also available at [employer]. CM contacted Aleena Torres's extension LVOM. Addendum: [phone] CM contacted by Nazia Camacho. Aleena informs CM that Meals on Meals is through [employer] and needs to be set up by a family member. CM sent message to [facility] AND [facility] with Aleena's contact information. CM placed order for home health and sent referral to [facility].

## 2023-04-24 NOTE — PROGRESS NOTES
Spiritual Care  follow up visit. Visited with patient at bedside. Patient expects  h    Prayed for patient's healing and health. Visit by Lito Rdz, Staff .  Jaclyn, Elly.RUTHIE., B.A.

## 2023-04-24 NOTE — PROGRESS NOTES
ACUTE PHYSICAL THERAPY GOALS:   (Developed with and agreed upon by patient and/or caregiver.)  Pt will perform bed mobility (I) in 7 therapy sessions. Pt will perform sit-to-stand/ stand-to-sit transfers Mod (I) c use of LRAD in 7 therapy sessions. Pt will ambulate 500 ft SB (A) with use of LRAD, no LOB/miss-steps and breaks as needed in 7 therapy sessions. Pt will perform standing dynamic balance activities with minimal postural sway in 7 therapy sessions. Pt will tolerate multiple sets and reps of BLE exercises in 7 therapy sessions. PHYSICAL THERAPY: Daily Note AM   (Link to Caseload Tracking: PT Visit Days : 2  Time In/Out PT Charge Capture  Rehab Caseload Tracker  Orders    Silver Hidalgo is a 80 y.o. male   PRIMARY DIAGNOSIS: Volume overload  Dyspnea [R06.00]       Inpatient: Payor: Omero Xie / Plan: MEDICARE PART A AND B / Product Type: *No Product type* /     ASSESSMENT:     REHAB RECOMMENDATIONS:   Recommendation to date pending progress:  Setting:  Home Health Therapy    Equipment:    Carlos Valle  Has in-room now     ASSESSMENT:  Mr. Cristobal Resides was supine in bed on RA upon entering the room and agreeable to therapy. Today, pt demonstrates continued progress toward established goals. This session, pt required SB/CG(A), inc time and cueing for all mobility while requiring additional use of additional use of RW/gait belt for ambulation of 500'x1. While moving on their feet, pt cont to ambulate c wide CHERI, forward flexed posture, decreased johnny and increased postural sway although he ultimately did well to avoid any LOB or miss-steps. Furthermore, pt performed all activity on RA and denied any dizziness, lightheadedness or SOB throughout duration of session. At this time, pt remains a good candidate for skilled PT and will continue to benefit from advancing POC. At end of session, pt was positioned to comfort in chair with all needs in reach. RN was made aware of pt performance.         SUBJECTIVE:

## 2023-04-24 NOTE — CARE COORDINATION
04/24/23 1522   Social/Functional History   ADL Assistance Independent   Ambulation Assistance Needs assistance  (RW)   Transfer Assistance Independent   Discharge Planning   Type of [place] Family Members  Grazyna, Olu Veealphonso)   Current Services Prior To Admission None   Potential [address] Care;Meals On Wheels   DME Ordered? Errol Beltran  (From [place])   Type of [address] OT;PT;Nursing Services;Meals on Wheels  ([facility]. Senior [address] for [employer] [address]   Patient expects to be discharged to: Yony Suarez 90 Discharge   Transition of Care Consult (CM Consult) [facility] No   Reason Outside [facility] of service area   [facility] Discharge Home Health;Meals on wheels   [address] Provided? No   Mode of Transport at Discharge   (Son and DIL to transport home by car.)   Confirm Follow Up Transport Family   Condition of Participation: Discharge Planning   The Plan for Transition of Care is related to the following treatment goals: Home with home health to return to baseline function. Meals on Wheels support overall health   The Patient and/or Patient Representative was provided with a Choice of Provider? Patient;Patient Representative   Name of the Patient Representative who was provided with the Choice of Provider and agrees with the Discharge Plan? Son, [place]   The Patient and/Or Patient Representative agree with the Discharge Plan? Yes   Freedom of Choice list was provided with basic dialogue that supports the patient's individualized plan of care/goals, treatment preferences, and shares the quality data associated with the providers?   Yes

## 2023-04-24 NOTE — PROGRESS NOTES
Warfarin dosing per pharmacist    Luis Maurer is a 80 y.o. male. Height: 6' (182.9 cm)  Weight: 161 lb 9.6 oz (73.3 kg)    Indication:  Afib    Goal INR:  2-3    Home dose:  5 mg on Mon/Wed/Fri; 7.5 mg all other days    Risk factors or significant drug interactions:  primidone    Other anticoagulants:  none    Lab Results   Component Value Date/Time    HGB 13.6 04/19/2023 12:02 PM       Daily Monitoring  Date  INR     Warfarin dose Notes  4/19  2.8  5 mg  Pt took at home   4/20  2.4  7.5 mg  4/21  2.4  5 mg  4/22  2.5  7.5 mg  4/23  2.4  7.5 mg  4/24  2.6  5 mg    Pharmacy is consulted to dose warfarin for Mr. Joaquina Rogers during this admission. His home dosing regimen was confirmed with recent outpatient anticoagulation clinic notes. Patient presented with symptoms of volume overload for treatment with IV diuretics. INR therapeutic today at 2.6. Continue home dosing regimen and give 5 mg this evening. INR has been stable, will adjust INR checks to MWF. Pharmacy will continue to monitor and make dose adjustments as clinically indicated. Please call with any questions.      Thank you,  Quintin Judge, PharmD  Clinical Pharmacy Specialist

## 2023-04-25 ENCOUNTER — TELEPHONE (OUTPATIENT)
Dept: CARDIOLOGY CLINIC | Age: 88
End: 2023-04-25

## 2023-04-25 NOTE — TELEPHONE ENCOUNTER
Triage called pt for transitional care call following dc from [facility]. Admit date: 4/19/23   Discharge date: 4/24/23  Admission diagnosis: dyspnea  Cardiology procedures this admission: none    TC appt 5/11/23 at 12:30 with Dr. Mario Gaona at [facility] office  5/3/23 appt with coumadin clinic at 10:30 at [facility] office    Pt states he is doing well since dc from [facility]. Pt states HHN just left and she reviewed medications with pt. Triage reviewed dc instructions. Confirms TC appt and appt with coumadin clinic. Advised pt to call our office back with increased edema, shortness of breath and weight gain. Pt verbalizes understanding to all  and agrees to plan.

## 2023-04-26 NOTE — PROGRESS NOTES
Physician Progress Note      PATIENT:               Sharlyne Buerger  CSN #:                  [id number]  :                       1934  ADMIT DATE:       2023 11:09 AM  DISCH DATE:        2023 5:00 PM  RESPONDING  PROVIDER #:        Leonel Murdock MD          QUERY TEXT:    Patient admitted with acute systolic congestive heart failure. Noted   documentation of acute systolic congestive heart failure and chronic systolic   congestive heart failure. The medical record reflects the following:  Risk Factors: Per  cardiology note \"80year-old male who presents today   for follow-up. He notes marked lower extremity edema including scrotal   swelling. He feels that he is becoming more debilitated. Notes he has been   sleeping poorly due to dyspnea. \"  Clinical Indicators: Per cardiology H&P,  &  progress notes\" Acute   systolic congestive heart failure. Severely volume overloaded. Appears   debilitated. \"  Per ,  and discharge summary progress notes \"Chronic systolic   congestive heart failure. Patient with mildly reduced to low normal left   ventricular systolic function. \"  Treatment: IV lasix, transitioned to PO at discharge  Options provided:  -- Acute systolic CHF  -- Acute on chronic systolic CHF  -- Chronic systolic CHF  -- Other - I will add my own diagnosis  -- Disagree - Not applicable / Not valid  -- Disagree - Clinically unable to determine / Unknown  -- Refer to Clinical Documentation Reviewer    PROVIDER RESPONSE TEXT:    This patient has acute on chronic systolic CHF.     Query created by: Debbie Wheatley on 2023 1:09 PM      Electronically signed by:  Leonel Murdock MD 2023 1:13 PM

## 2023-05-01 ENCOUNTER — TELEPHONE (OUTPATIENT)
Dept: CARDIOLOGY CLINIC | Age: 88
End: 2023-05-01

## 2023-05-01 NOTE — TELEPHONE ENCOUNTER
Pt daughter in law Wander Perez states that pt was sent to the [facility] ER on 4/19/2023 when pt was here for a office visit and was prescribed Jardiance in the hospital by Dr. Angelo Davis, but pt can not afford to purchase the medication it is $600 a month and wants to know if there is cheaper alternative or do the pt really need to be taking this medication? Wander Perez also states that she thinks that they need a prior auth for the medication at the pharmacy. Wander Perez would appreciate a call back.

## 2023-05-01 NOTE — TELEPHONE ENCOUNTER
Spoke with daughter -in-law, states that Rivas Hopson is $1 without insurance. Does not know if patient needs PA or not. Also asked about prescription lab order for BMP. Daughter in law informed that I would call pharmacy to see what is needed for the Rivas Hopson, she can take the prescription to [facility] and have labs drawn before next appointment.  Dearl Patient voiced understanding//brendab  [phone] for VidAngel

## 2023-05-02 NOTE — TELEPHONE ENCOUNTER
our PA request has been approved. Additional information will be provided in the approval communication.  Approved 4-2-23 to 5-1-24  Pharmacy called , spoke with Farooq Cadena, given PA approval.left message on voice mail of Barby Deluna of approval//alyson

## 2023-05-02 NOTE — TELEPHONE ENCOUNTER
Spoke with pharmacist, patient needs PA submitted.  Would need tier exception or PA as they want to have him try metformin first, patient is not diabetic will send form to [phone]//keanuab

## 2023-05-03 ENCOUNTER — ANTI-COAG VISIT (OUTPATIENT)
Dept: CARDIOLOGY CLINIC | Age: 88
End: 2023-05-03
Payer: MEDICARE

## 2023-05-03 DIAGNOSIS — I48.91 ATRIAL FIBRILLATION (HCC): ICD-10-CM

## 2023-05-03 DIAGNOSIS — Z95.2 H/O AORTIC VALVE REPLACEMENT: Primary | ICD-10-CM

## 2023-05-03 DIAGNOSIS — Z79.01 LONG TERM (CURRENT) USE OF ANTICOAGULANTS: ICD-10-CM

## 2023-05-03 DIAGNOSIS — I48.11 LONGSTANDING PERSISTENT ATRIAL FIBRILLATION (HCC): ICD-10-CM

## 2023-05-03 LAB
POC INR: 3.9
PROTHROMBIN TIME, POC: NORMAL

## 2023-05-03 PROCEDURE — 93793 ANTICOAG MGMT PT WARFARIN: CPT | Performed by: INTERNAL MEDICINE

## 2023-05-03 PROCEDURE — 85610 PROTHROMBIN TIME: CPT | Performed by: INTERNAL MEDICINE

## 2023-05-03 NOTE — PATIENT INSTRUCTIONS
Reminder: Please contact the Coumadin Clinic at [phone]  when you have medication changes. Examples, new medications, antibiotics, discontinued medications, new supplements, missed doses of warfarin or if you took extra doses of warfarin. This also includes OTC medications. Notifying us helps reduce the possibility of high and low INR's. In addition, if warfarin needs to be held for any procedures, please have surgeon or physician's office contact us before holding anticoagulant. Thanks, [facility] Cardiology Coumadin Clinic.

## 2023-05-03 NOTE — PROGRESS NOTES
Pt is above range, Pt had blood in his stool on about 3 to 4 days ago. Pt states that he had wine and chili that night. Pt states that stool is know normal. Will decrease dosage and recheck in 1 week as pt has several medication changes. Anticoagulation Summary  As of 5/3/2023      INR goal:  2.0-3.0   TTR:  63.3 % (10.7 mo)   INR used for dosing:     Warfarin maintenance plan:  7.5 mg (7.5 mg x 1) every Mon, Wed, Fri; 5 mg (5 mg x 1) all other days; Starting 5/4/2023   Weekly warfarin total:  42.5 mg   Plan last modified:  Tariq Garrison MA (5/3/2023)   Next INR check:  5/10/2023   Target end date:   Indefinite    Indications    H/O aortic valve replacement [Z95.2]  Longstanding persistent atrial fibrillation ([address] [I48.11]  Long term (current) use of anticoagulants [Z79.01]                 Anticoagulation Episode Summary       INR check location:  Anticoagulation Clinic    Preferred lab:      Send INR reminders to:  [address] PT    Comments:            Anticoagulation Care Providers       Provider Role Specialty Phone number    Toya Wiggins MD Responsible Cardiology [phone]

## 2023-05-04 ENCOUNTER — TELEPHONE (OUTPATIENT)
Dept: CARDIOLOGY CLINIC | Age: 88
End: 2023-05-04

## 2023-05-04 LAB
BUN SERPL-MCNC: 62 MG/DL (ref 8–27)
BUN/CREAT SERPL: 37 (ref 10–24)
CALCIUM SERPL-MCNC: 9.4 MG/DL (ref 8.6–10.2)
CHLORIDE SERPL-SCNC: 98 MMOL/L (ref 96–106)
CO2 SERPL-SCNC: 23 MMOL/L (ref 20–29)
CREAT SERPL-MCNC: 1.66 MG/DL (ref 0.76–1.27)
EGFRCR SERPLBLD CKD-EPI 2021: 39 ML/MIN/1.73
GLUCOSE SERPL-MCNC: 130 MG/DL (ref 70–99)
POTASSIUM SERPL-SCNC: 4.4 MMOL/L (ref 3.5–5.2)
SODIUM SERPL-SCNC: 138 MMOL/L (ref 134–144)
SPECIMEN STATUS REPORT: NORMAL

## 2023-05-04 NOTE — TELEPHONE ENCOUNTER
Pt called in and states that he's passing blood through his stool and very concern about it that's being going on for a week

## 2023-05-05 NOTE — TELEPHONE ENCOUNTER
I spoke to the pt. He said he lives in [facility] & is asking which ER he needs to go to. I told him to go to the [facility] ER to him. Pt states the bleeding is definitely getting worse. I advised pt Not to drive himself to the ER but to get someone to take him or call EMS if needed. Pt agreed. He said he should be able to get someone to take him & will go to [facility] ER today.

## 2023-05-05 NOTE — TELEPHONE ENCOUNTER
Pt states he has had blood in his stool for over a week now. Pt is on warfarin & had a PT/INR on 5/3/23. INR was 3.9. He told Ramana Meyer, in the Coumadin clinic that he has wine & chili the night before & that his stool was normal again. Warfarin was adjusted accordingly & pt to return for INR in 1 week. I spoke to the pt today. He said bleeding is back & appears a little worse. I recommended pt go to the ER now to be evaluated especially since it has been going on for a week. Pt agreed.

## 2023-05-05 NOTE — TELEPHONE ENCOUNTER
Pt states that he has been passing blood thru his stool for a week now since he got out of the hospital and pt is been weak since he has lost so much blood and has some dizziness this morning. Pt does not know what he do or which hospital he should go to? Pt denies CP and SOB.  Pt would appreciate a call back

## 2023-05-09 ENCOUNTER — TELEPHONE (OUTPATIENT)
Dept: CARDIOLOGY CLINIC | Age: 88
End: 2023-05-09

## 2023-05-09 NOTE — TELEPHONE ENCOUNTER
Son called in and states that Pt is currently in the hospital. Pt has been in the hospital since last Friday. Son states that Pt was light headed and they admitted him. Pt also had Blood in his colon and the ER admitted him. PT kept losing blood and also had blood in his BM.

## 2023-05-09 NOTE — TELEPHONE ENCOUNTER
Son, [place], called to let us know that patient is at [facility]. Patient had real dizzy stool on Friday May 5, had been having blood in his stool, did not go/call doctor. Had EGD and colonoscopy and showed that he had blood loss. Has stopped his coumadin, today hemoglobin 7.5 mg. Son wanted to know if patient would be a candidate for the watchman. Son informed that Dr. Sravan Rivero would be asked. Son informed that I would call him back with Dr. Mello Loop response. Asked for son to keep us updated.  He Voiced understanding and thanked me//alyson

## 2023-05-09 NOTE — TELEPHONE ENCOUNTER
He certainly could be considered depending on his strength level. Can see him back in the office after he is recovered from his hospitalization to discuss further     Son, [place], notified of Dr Carrera's response.  Voiced understanding//keanuab

## 2023-05-25 ENCOUNTER — TELEPHONE (OUTPATIENT)
Age: 88
End: 2023-05-25

## 2023-05-25 NOTE — TELEPHONE ENCOUNTER
----- Message -----  From: Aishwarya Christian  Sent: 5/24/2023   6:30 PM EDT  To: [facility] Cardiology Clinical Staff  Subject: Rafael Crystal Rx concerns           This message is being sent by Yimi March on behalf of Nakul Edwards. Maicol Seo is at [address] [facility], and they are having issues regarding low blood pressure. They have been making various adjustments to his heart medications, and we are concerned. We would appreciate your input since you are his cardiologist.  We dont know if you can make recommendations based on what you see in EPIC. We can explain more fully if needed. Thank you for consideration!     Iva Philip - [address] [place] [address] [phone]

## 2023-05-25 NOTE — TELEPHONE ENCOUNTER
Spoke with daughter-in-law, Taylor Khan, who states that after patient was hospitalized in [facility], he was hospitalized in [facility] and [facility] for GI bleed, and is now in [place]. Extremely dizzy with decreased depth perception when he stands from sitting.   5/23/23 standing BP-83/68.   5/24/23 BP-90/60. Severe LE edema. Per Taylor Khan, facility NP ordered IV fluids for volume deficit with BUN 44 and creatine 1.9, recommended patient hold lasix x 3 days, decrease metoprolol to 50 mg BID or hold metoprolol, hold lasix 40 mg qd x 3 days, and continue lisinopril 5 mg qd and spironolactone 25 mg qd. Holding Eliquis, which replaced warfarin, after GI bleed due to Hbg-7.7-8.5. Missed FU appointment because he was hospitalized. Taylor Khan is very concerned about med changes, and asks if Dr. Matilda Llanos thinks changes are reasonable. Advised Antonette that I will notify Dr. Matilda Llanos of above and call with his response. Advised Antonette that med changes sound reasonable. Advised her that NP has been changing meds to protect patient's kidneys and resolve volume depletion. Advised Antonette that Eliquis is being held due to probable continued active bleeding with low Hgb. Scheduled next available appointment with Dr. Matilda Llanos on 7/31/23 at 3:30 pm at [place]. Antonette verbalized understanding.

## 2023-05-25 NOTE — TELEPHONE ENCOUNTER
MD Juan C Ivy RN  Caller: Unspecified (Today,  9:58 AM)  Occult for me to know without seeing him but I would stop his lisinopril and spironolactone if his renal function is worse. Okay to decrease metoprolol and hold Lasix. Would be happy to help in any way.

## 2023-07-18 PROBLEM — Z87.19 H/O: GI BLEED: Status: ACTIVE | Noted: 2023-07-18

## 2023-07-18 ASSESSMENT — ENCOUNTER SYMPTOMS: SHORTNESS OF BREATH: 0

## 2023-07-19 ENCOUNTER — OFFICE VISIT (OUTPATIENT)
Age: 88
End: 2023-07-19
Payer: MEDICARE

## 2023-07-19 VITALS
BODY MASS INDEX: 19.48 KG/M2 | HEART RATE: 64 BPM | WEIGHT: 143.8 LBS | HEIGHT: 72 IN | SYSTOLIC BLOOD PRESSURE: 122 MMHG | DIASTOLIC BLOOD PRESSURE: 60 MMHG

## 2023-07-19 DIAGNOSIS — D50.0 IRON DEFICIENCY ANEMIA DUE TO CHRONIC BLOOD LOSS: ICD-10-CM

## 2023-07-19 DIAGNOSIS — I48.11 LONGSTANDING PERSISTENT ATRIAL FIBRILLATION (HCC): ICD-10-CM

## 2023-07-19 DIAGNOSIS — I50.22 CHRONIC SYSTOLIC CONGESTIVE HEART FAILURE (HCC): Primary | ICD-10-CM

## 2023-07-19 DIAGNOSIS — Z87.19 H/O: GI BLEED: ICD-10-CM

## 2023-07-19 DIAGNOSIS — I25.10 ATHEROSCLEROSIS OF NATIVE CORONARY ARTERY OF NATIVE HEART WITHOUT ANGINA PECTORIS: ICD-10-CM

## 2023-07-19 DIAGNOSIS — Z95.2 H/O AORTIC VALVE REPLACEMENT: ICD-10-CM

## 2023-07-19 DIAGNOSIS — N18.32 STAGE 3B CHRONIC KIDNEY DISEASE (HCC): ICD-10-CM

## 2023-07-19 DIAGNOSIS — I10 ESSENTIAL HYPERTENSION: ICD-10-CM

## 2023-07-19 PROBLEM — N18.9 CKD (CHRONIC KIDNEY DISEASE): Status: ACTIVE | Noted: 2023-07-19

## 2023-07-19 LAB
ANION GAP SERPL CALC-SCNC: 6 MMOL/L (ref 2–11)
BUN SERPL-MCNC: 44 MG/DL (ref 8–23)
CALCIUM SERPL-MCNC: 9.2 MG/DL (ref 8.3–10.4)
CHLORIDE SERPL-SCNC: 105 MMOL/L (ref 101–110)
CO2 SERPL-SCNC: 28 MMOL/L (ref 21–32)
CREAT SERPL-MCNC: 1.7 MG/DL (ref 0.8–1.5)
ERYTHROCYTE [DISTWIDTH] IN BLOOD BY AUTOMATED COUNT: 19.2 % (ref 11.9–14.6)
GLUCOSE SERPL-MCNC: 108 MG/DL (ref 65–100)
HCT VFR BLD AUTO: 35 % (ref 41.1–50.3)
HGB BLD-MCNC: 10.8 G/DL (ref 13.6–17.2)
MCH RBC QN AUTO: 26.7 PG (ref 26.1–32.9)
MCHC RBC AUTO-ENTMCNC: 30.9 G/DL (ref 31.4–35)
MCV RBC AUTO: 86.4 FL (ref 82–102)
NRBC # BLD: 0 K/UL (ref 0–0.2)
PLATELET # BLD AUTO: 207 K/UL (ref 150–450)
PMV BLD AUTO: 11.7 FL (ref 9.4–12.3)
POTASSIUM SERPL-SCNC: 4.4 MMOL/L (ref 3.5–5.1)
RBC # BLD AUTO: 4.05 M/UL (ref 4.23–5.6)
SODIUM SERPL-SCNC: 139 MMOL/L (ref 133–143)
WBC # BLD AUTO: 6 K/UL (ref 4.3–11.1)

## 2023-07-19 PROCEDURE — G8420 CALC BMI NORM PARAMETERS: HCPCS | Performed by: INTERNAL MEDICINE

## 2023-07-19 PROCEDURE — 1123F ACP DISCUSS/DSCN MKR DOCD: CPT | Performed by: INTERNAL MEDICINE

## 2023-07-19 PROCEDURE — 99214 OFFICE O/P EST MOD 30 MIN: CPT | Performed by: INTERNAL MEDICINE

## 2023-07-19 PROCEDURE — 1036F TOBACCO NON-USER: CPT | Performed by: INTERNAL MEDICINE

## 2023-07-19 PROCEDURE — G8427 DOCREV CUR MEDS BY ELIG CLIN: HCPCS | Performed by: INTERNAL MEDICINE

## 2023-07-19 RX ORDER — LANOLIN ALCOHOL/MO/W.PET/CERES
1000 CREAM (GRAM) TOPICAL DAILY
COMMUNITY

## 2023-07-19 RX ORDER — PRIMIDONE 50 MG/1
50 TABLET ORAL DAILY
COMMUNITY

## 2023-07-19 RX ORDER — FERROUS SULFATE 325(65) MG
325 TABLET ORAL
COMMUNITY

## 2023-07-19 RX ORDER — FOLIC ACID 1 MG/1
5 TABLET ORAL DAILY
COMMUNITY

## 2023-07-19 RX ORDER — ASPIRIN 81 MG/1
81 TABLET ORAL DAILY
COMMUNITY

## 2023-07-19 RX ORDER — LEVOTHYROXINE SODIUM 0.03 MG/1
25 TABLET ORAL DAILY
COMMUNITY

## 2023-07-19 RX ORDER — FUROSEMIDE 40 MG/1
40 TABLET ORAL DAILY
Qty: 90 TABLET | Refills: 3 | Status: SHIPPED | OUTPATIENT
Start: 2023-07-19

## 2023-07-19 RX ORDER — PANTOPRAZOLE SODIUM 40 MG/1
40 TABLET, DELAYED RELEASE ORAL DAILY
COMMUNITY

## 2023-07-19 NOTE — PROGRESS NOTES
[address] [place]  [address] [address]  PHONE: [phone]    Mahin Kaufman  6/26/1934      SUBJECTIVE:   Mahin Kaufman is a 80 y.o. male seen for a follow up visit regarding the following:     Chief Complaint   Patient presents with    Atrial Fibrillation    Congestive Heart Failure    Hypertension       HPI:    Patient presents for follow-up. Have not seen him since April. He was seen in the office on April 19 and he was markedly edematous. He was admitted to the hospital for diuresis. It was noted at that visit his recent echo showed:    Echo (4/10/2023): EF 45-50%. AVR: MG 16.  PG 23. Moderate Aortic Regurgitation. Mild to moderate MR. Severe TR. Severe biatrial enlargement    He underwent diuresis and was ultimately discharged on April 24. It appears that he was started on Aldactone 25 mg daily, Jardiance 10 mg daily and Lasix 40 mg twice daily. He was continued on metoprolol 50 mg and lisinopril 5 mg a day    He was admitted in May to [address] [facility] with acute anemia. Review of records shows that his INR was supratherapeutic. It appears that they discontinued his Coumadin and had him start Eliquis 1 week after discharge. It appears that colonoscopy showed intestinal diverticulosis as a likely source of his bleeding. Hgb decreased to 6.9. Required transfusion of 2 units and received vitamin K. His son contacted our office about possible watchman implantation. Last CBC on 6/16/23:  Hgb 8.6. Last Cr 1.8 on 6/16/23. He contacted our office recently at the end of May with hypotension at rehab. Medications adjusted. Past Medical History, Past Surgical History, Family history, Social History, and Medications were all reviewed with the patient today and updated as necessary.            Current Outpatient Medications:     ferrous sulfate (IRON 325) 325 (65 Fe) MG tablet, Take 1 tablet by mouth daily (with breakfast), Disp: , Rfl:

## 2023-07-20 ENCOUNTER — TELEPHONE (OUTPATIENT)
Age: 88
End: 2023-07-20

## 2023-07-20 NOTE — RESULT ENCOUNTER NOTE
Please call patient. Labs look OK. Renal function stable and hgb/anemia improved.   Continue current medications as we discussed    Thank you

## 2023-07-20 NOTE — TELEPHONE ENCOUNTER
Daughter in law notified of results voiced understanding//alyson    ----- Message from Milo Freeman MD sent at 7/19/2023  8:13 PM EDT -----  Please call patient. Labs look OK. Renal function stable and hgb/anemia improved.   Continue current medications as we discussed    Thank you

## 2023-11-09 ENCOUNTER — OFFICE VISIT (OUTPATIENT)
Age: 88
End: 2023-11-09
Payer: MEDICARE

## 2023-11-09 VITALS
DIASTOLIC BLOOD PRESSURE: 55 MMHG | HEART RATE: 59 BPM | BODY MASS INDEX: 19.39 KG/M2 | WEIGHT: 143.2 LBS | HEIGHT: 72 IN | SYSTOLIC BLOOD PRESSURE: 122 MMHG

## 2023-11-09 DIAGNOSIS — D50.0 IRON DEFICIENCY ANEMIA DUE TO CHRONIC BLOOD LOSS: ICD-10-CM

## 2023-11-09 DIAGNOSIS — Z95.2 H/O AORTIC VALVE REPLACEMENT: ICD-10-CM

## 2023-11-09 DIAGNOSIS — E78.2 MIXED HYPERLIPIDEMIA: ICD-10-CM

## 2023-11-09 DIAGNOSIS — I50.22 CHRONIC SYSTOLIC CONGESTIVE HEART FAILURE (HCC): ICD-10-CM

## 2023-11-09 DIAGNOSIS — I10 ESSENTIAL HYPERTENSION: ICD-10-CM

## 2023-11-09 DIAGNOSIS — I48.11 LONGSTANDING PERSISTENT ATRIAL FIBRILLATION (HCC): ICD-10-CM

## 2023-11-09 DIAGNOSIS — I25.10 ATHEROSCLEROSIS OF NATIVE CORONARY ARTERY OF NATIVE HEART WITHOUT ANGINA PECTORIS: Primary | ICD-10-CM

## 2023-11-09 PROCEDURE — G8428 CUR MEDS NOT DOCUMENT: HCPCS | Performed by: INTERNAL MEDICINE

## 2023-11-09 PROCEDURE — G8420 CALC BMI NORM PARAMETERS: HCPCS | Performed by: INTERNAL MEDICINE

## 2023-11-09 PROCEDURE — G8484 FLU IMMUNIZE NO ADMIN: HCPCS | Performed by: INTERNAL MEDICINE

## 2023-11-09 PROCEDURE — 1036F TOBACCO NON-USER: CPT | Performed by: INTERNAL MEDICINE

## 2023-11-09 PROCEDURE — 99214 OFFICE O/P EST MOD 30 MIN: CPT | Performed by: INTERNAL MEDICINE

## 2023-11-09 PROCEDURE — 1123F ACP DISCUSS/DSCN MKR DOCD: CPT | Performed by: INTERNAL MEDICINE

## 2023-11-09 RX ORDER — DIGOXIN 125 MCG
0.12 TABLET ORAL DAILY
Qty: 90 TABLET | Refills: 3 | Status: SHIPPED | OUTPATIENT
Start: 2023-11-09

## 2023-11-09 ASSESSMENT — ENCOUNTER SYMPTOMS: SHORTNESS OF BREATH: 0

## 2023-11-09 NOTE — PROGRESS NOTES
[address] [place] [id number], [place], [address]  PHONE: [phone]    Ai Ernst  6/26/1934      SUBJECTIVE:   Ai Ernst is a 80 y.o. male seen for a follow up visit regarding the following:     Chief Complaint   Patient presents with    Atrial Fibrillation       HPI:    Ai Ernst presents for routine follow up for known Coronary Artery Disease. Multiple issues addressed as outlined below:     Coronary Artery Disease:  Patient denies any recent angina. Notes compliance with medical therapy. No recent NTG use. No intolerance to anti-platelet therapy. CHF:  Weights 135-140. No edema. He has decreased his Lasix to 20 mg. He has only been taking his lower dose for 1 week. Atrial fibrillation:  Rate controlled. On ASA. No neurologic symptoms. Hypertension:  Ambulatory BP readings have been controlled. Patient reports compliance with medical therapy without side effects. CKD:  Last cr 1.4. Past Medical History, Past Surgical History, Family history, Social History, and Medications were all reviewed with the patient today and updated as necessary.            Current Outpatient Medications:     digoxin (LANOXIN) 125 MCG tablet, Take 1 tablet by mouth daily, Disp: 90 tablet, Rfl: 3    aspirin 81 MG EC tablet, Take 1 tablet by mouth daily, Disp: , Rfl:     vitamin B-12 (CYANOCOBALAMIN) 1000 MCG tablet, Take 1 tablet by mouth daily, Disp: , Rfl:     folic acid (FOLVITE) 1 MG tablet, Take 5 tablets by mouth daily, Disp: , Rfl:     levothyroxine (SYNTHROID) 25 MCG tablet, Take 1 tablet by mouth Daily, Disp: , Rfl:     primidone (MYSOLINE) 50 MG tablet, Take 1 tablet by mouth daily, Disp: , Rfl:     furosemide (LASIX) 40 MG tablet, Take 1 tablet by mouth daily (Patient taking differently: Take 0.5 tablets by mouth daily), Disp: 90 tablet, Rfl: 3    empagliflozin (JARDIANCE) 10 MG tablet, Take 1 tablet by mouth daily, Disp: 30 tablet, Rfl: 3    metoprolol

## 2024-05-10 ENCOUNTER — OFFICE VISIT (OUTPATIENT)
Age: 89
End: 2024-05-10
Payer: MEDICARE

## 2024-05-10 VITALS
DIASTOLIC BLOOD PRESSURE: 68 MMHG | HEART RATE: 80 BPM | BODY MASS INDEX: 19.77 KG/M2 | SYSTOLIC BLOOD PRESSURE: 118 MMHG | WEIGHT: 146 LBS | HEIGHT: 72 IN

## 2024-05-10 DIAGNOSIS — I10 ESSENTIAL HYPERTENSION: Primary | ICD-10-CM

## 2024-05-10 DIAGNOSIS — I50.22 CHRONIC SYSTOLIC CONGESTIVE HEART FAILURE (HCC): ICD-10-CM

## 2024-05-10 DIAGNOSIS — N18.32 STAGE 3B CHRONIC KIDNEY DISEASE (HCC): ICD-10-CM

## 2024-05-10 DIAGNOSIS — Z95.2 H/O AORTIC VALVE REPLACEMENT: ICD-10-CM

## 2024-05-10 DIAGNOSIS — I25.10 ATHEROSCLEROSIS OF NATIVE CORONARY ARTERY OF NATIVE HEART WITHOUT ANGINA PECTORIS: ICD-10-CM

## 2024-05-10 DIAGNOSIS — I48.11 LONGSTANDING PERSISTENT ATRIAL FIBRILLATION (HCC): ICD-10-CM

## 2024-05-10 PROCEDURE — 1123F ACP DISCUSS/DSCN MKR DOCD: CPT | Performed by: INTERNAL MEDICINE

## 2024-05-10 PROCEDURE — 93000 ELECTROCARDIOGRAM COMPLETE: CPT | Performed by: INTERNAL MEDICINE

## 2024-05-10 PROCEDURE — G8420 CALC BMI NORM PARAMETERS: HCPCS | Performed by: INTERNAL MEDICINE

## 2024-05-10 PROCEDURE — G8427 DOCREV CUR MEDS BY ELIG CLIN: HCPCS | Performed by: INTERNAL MEDICINE

## 2024-05-10 PROCEDURE — 1036F TOBACCO NON-USER: CPT | Performed by: INTERNAL MEDICINE

## 2024-05-10 PROCEDURE — 99214 OFFICE O/P EST MOD 30 MIN: CPT | Performed by: INTERNAL MEDICINE

## 2024-05-10 RX ORDER — METOPROLOL SUCCINATE 50 MG/1
50 TABLET, EXTENDED RELEASE ORAL DAILY
Qty: 90 TABLET | Refills: 3
Start: 2024-05-10

## 2024-05-10 RX ORDER — FUROSEMIDE 40 MG/1
20 TABLET ORAL DAILY
Qty: 90 TABLET | Refills: 3 | Status: SHIPPED | OUTPATIENT
Start: 2024-05-10

## 2024-05-10 ASSESSMENT — ENCOUNTER SYMPTOMS: SHORTNESS OF BREATH: 0

## 2024-10-29 NOTE — PROGRESS NOTES
Caller: EARNEST CALDWELL    Relationship:   PATIENT    Best call back number:  [phone]    PATIENT CALLED REQUESTING TO CANCEL SAME DAY APPT.    Did the patient call AFTER the start time of their scheduled appointment?   NO    Was the patient's appointment rescheduled?  YES      Any additional information:     PATIENT ALSO RESCHEDULED 10/29/24 OV WITH MARIO CORDERO  
Occasional wrong-word or 'sound-a-like' substitutions may have occurred due to the inherent limitations of voice recognition software. Read the chart carefully and recognize, using context, where substitutions have occurred.

## 2024-12-06 ENCOUNTER — OFFICE VISIT (OUTPATIENT)
Age: 88
End: 2024-12-06
Payer: MEDICARE

## 2024-12-06 ENCOUNTER — TELEPHONE (OUTPATIENT)
Age: 88
End: 2024-12-06

## 2024-12-06 VITALS
DIASTOLIC BLOOD PRESSURE: 60 MMHG | SYSTOLIC BLOOD PRESSURE: 122 MMHG | HEIGHT: 73 IN | BODY MASS INDEX: 18.95 KG/M2 | WEIGHT: 143 LBS | HEART RATE: 64 BPM

## 2024-12-06 DIAGNOSIS — N18.32 STAGE 3B CHRONIC KIDNEY DISEASE (HCC): ICD-10-CM

## 2024-12-06 DIAGNOSIS — I25.10 ATHEROSCLEROSIS OF NATIVE CORONARY ARTERY OF NATIVE HEART WITHOUT ANGINA PECTORIS: ICD-10-CM

## 2024-12-06 DIAGNOSIS — I50.22 CHRONIC SYSTOLIC CONGESTIVE HEART FAILURE (HCC): Primary | ICD-10-CM

## 2024-12-06 DIAGNOSIS — I48.11 LONGSTANDING PERSISTENT ATRIAL FIBRILLATION (HCC): ICD-10-CM

## 2024-12-06 DIAGNOSIS — I27.20 PULMONARY HYPERTENSION (HCC): ICD-10-CM

## 2024-12-06 DIAGNOSIS — E78.2 MIXED HYPERLIPIDEMIA: ICD-10-CM

## 2024-12-06 DIAGNOSIS — I10 ESSENTIAL HYPERTENSION: ICD-10-CM

## 2024-12-06 PROCEDURE — G8427 DOCREV CUR MEDS BY ELIG CLIN: HCPCS | Performed by: INTERNAL MEDICINE

## 2024-12-06 PROCEDURE — G8484 FLU IMMUNIZE NO ADMIN: HCPCS | Performed by: INTERNAL MEDICINE

## 2024-12-06 PROCEDURE — G8420 CALC BMI NORM PARAMETERS: HCPCS | Performed by: INTERNAL MEDICINE

## 2024-12-06 PROCEDURE — 1126F AMNT PAIN NOTED NONE PRSNT: CPT | Performed by: INTERNAL MEDICINE

## 2024-12-06 PROCEDURE — 1123F ACP DISCUSS/DSCN MKR DOCD: CPT | Performed by: INTERNAL MEDICINE

## 2024-12-06 PROCEDURE — 1159F MED LIST DOCD IN RCRD: CPT | Performed by: INTERNAL MEDICINE

## 2024-12-06 PROCEDURE — 1036F TOBACCO NON-USER: CPT | Performed by: INTERNAL MEDICINE

## 2024-12-06 PROCEDURE — 99214 OFFICE O/P EST MOD 30 MIN: CPT | Performed by: INTERNAL MEDICINE

## 2024-12-06 RX ORDER — FERROUS SULFATE 325(65) MG
325 TABLET ORAL
COMMUNITY

## 2024-12-06 ASSESSMENT — ENCOUNTER SYMPTOMS: SHORTNESS OF BREATH: 0

## 2024-12-06 NOTE — PROGRESS NOTES
[address], [place] [address]  PHONE: [phone]    Juan Francisco Paiz  6/26/1934      SUBJECTIVE:   Juan Francisco Paiz is a 89 y.o. male seen for a follow up visit regarding the following:     Chief Complaint   Patient presents with    Results     ECHO       HPI:    Juan Francisco Paiz presents for routine follow up. Multiple issues addressed.     CHRONIC SYSTOLIC HEART FAILURE  Status:  No PND, orthopnea or edema.  Unclear what medications he has been taking.  Appears worsening memory.   Medications:    B-Blocker: Toprol-XL 50 mg daily.  ACE/ARB/ARNI:  None due to CKD  MRA:  None due to CKD  SGLT2i: Jardiance 10 mg daily.  Diuretic: Lasix 40 mg half tablet daily.  Prior History:      Echo (12/15/20):  EF 15-20%.  Severe RV dysfunction.  Normal bioprosthetic AV.  MG 8. PG 13.  Moderate MR.  Severe TR.  RVSP 36.   2.  Echo (5/3/21):  EF 45-50%.  +DD. Mild RV HK.  AVR:  Mild AR.  MG 12. PG 22.  Moderate MR.  Severe TR.  RVSP 51.    3.  Echo (4/10/2023): EF 45-50%.  AVR: MG 16.  PG 23.  Moderate Aortic Regurgitation.  Mild to moderate MR.  Severe TR.  Severe biatrial enlargement  4.  Echo (11/14/2024): EF 55-60%.  RV flattening consistent with volume/pressure overload.  RV mildly dilated with reduced RV systolic function.  AVR: MG 17.  Moderate AR.  Mild/moderate MR.  Moderate/severe TR.  RVSP 58.  Severe LAE    Pulmonary Hypertension  Status:  No edema.  Stable NARVAEZ.   Prior History:      Echo (11/14/2024): EF 55-60%.  RV flattening consistent with volume/pressure overload.  RV mildly dilated with reduced RV systolic function.  AVR: MG 17.  Moderate AR.  Mild/moderate MR.  Moderate/severe TR.  RVSP 58.  Severe LAE      Coronary Artery Disease  Status:  Patient denies any recent angina.  Notes compliance with medical therapy.  No recent NTG use.  No intolerance to anti-platelet therapy.   Medications: Aspirin 81 mg daily.  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

## 2024-12-06 NOTE — TELEPHONE ENCOUNTER
I prepare Jose’s pill boxes for him.  He has had trouble remembering to take an afternoon or evening dosage in the past but consistently and reliably takes all of his prescriptions in the morning.  He has been taking the 20 mg Lasix and 50mg metoprolol for months now.  Does he need to start taking more Lasix or metoprolol or should we maintain the current regime?    Thank you,  Antonette      I received a voicemail and am responding because I could not get through on the phone line. The medications that you have listed on his visit sheet today are correct. However, there are a couple of dosages to update. The Lasix dosage is 20 mg one time a day. The Synthroid is 50 micrograms one time a day. He does take metoprolol at 50 mg one time per day. Feel free to message me if you have any other questions.     Thank you,  Antonette Paiz

## 2025-06-11 ENCOUNTER — OFFICE VISIT (OUTPATIENT)
Age: 89
End: 2025-06-11
Payer: MEDICARE

## 2025-06-11 VITALS
BODY MASS INDEX: 19.11 KG/M2 | SYSTOLIC BLOOD PRESSURE: 116 MMHG | DIASTOLIC BLOOD PRESSURE: 52 MMHG | HEART RATE: 64 BPM | HEIGHT: 73 IN | WEIGHT: 144.2 LBS

## 2025-06-11 DIAGNOSIS — I10 ESSENTIAL HYPERTENSION: ICD-10-CM

## 2025-06-11 DIAGNOSIS — I50.22 CHRONIC SYSTOLIC CONGESTIVE HEART FAILURE (HCC): ICD-10-CM

## 2025-06-11 DIAGNOSIS — I27.20 PULMONARY HYPERTENSION (HCC): Primary | ICD-10-CM

## 2025-06-11 DIAGNOSIS — I45.10 RBBB: ICD-10-CM

## 2025-06-11 DIAGNOSIS — I25.10 ATHEROSCLEROSIS OF NATIVE CORONARY ARTERY OF NATIVE HEART WITHOUT ANGINA PECTORIS: ICD-10-CM

## 2025-06-11 DIAGNOSIS — E78.2 MIXED HYPERLIPIDEMIA: ICD-10-CM

## 2025-06-11 DIAGNOSIS — I48.11 LONGSTANDING PERSISTENT ATRIAL FIBRILLATION (HCC): ICD-10-CM

## 2025-06-11 DIAGNOSIS — Z95.2 H/O AORTIC VALVE REPLACEMENT: ICD-10-CM

## 2025-06-11 PROBLEM — R06.00 DYSPNEA: Status: RESOLVED | Noted: 2023-04-19 | Resolved: 2025-06-11

## 2025-06-11 PROBLEM — Z79.01 LONG TERM (CURRENT) USE OF ANTICOAGULANTS: Status: RESOLVED | Noted: 2018-09-12 | Resolved: 2025-06-11

## 2025-06-11 PROBLEM — E87.70 VOLUME OVERLOAD: Chronic | Status: RESOLVED | Noted: 2023-04-22 | Resolved: 2025-06-11

## 2025-06-11 PROCEDURE — 99214 OFFICE O/P EST MOD 30 MIN: CPT | Performed by: INTERNAL MEDICINE

## 2025-06-11 PROCEDURE — G8428 CUR MEDS NOT DOCUMENT: HCPCS | Performed by: INTERNAL MEDICINE

## 2025-06-11 PROCEDURE — 93000 ELECTROCARDIOGRAM COMPLETE: CPT | Performed by: INTERNAL MEDICINE

## 2025-06-11 PROCEDURE — 1036F TOBACCO NON-USER: CPT | Performed by: INTERNAL MEDICINE

## 2025-06-11 PROCEDURE — 1126F AMNT PAIN NOTED NONE PRSNT: CPT | Performed by: INTERNAL MEDICINE

## 2025-06-11 PROCEDURE — G8420 CALC BMI NORM PARAMETERS: HCPCS | Performed by: INTERNAL MEDICINE

## 2025-06-11 PROCEDURE — 1123F ACP DISCUSS/DSCN MKR DOCD: CPT | Performed by: INTERNAL MEDICINE

## 2025-06-11 RX ORDER — DIGOXIN 125 MCG
0.12 TABLET ORAL DAILY
Qty: 90 TABLET | Refills: 3 | Status: CANCELLED | OUTPATIENT
Start: 2025-06-11

## 2025-06-11 RX ORDER — METOPROLOL SUCCINATE 50 MG/1
50 TABLET, EXTENDED RELEASE ORAL DAILY
Qty: 90 TABLET | Refills: 3 | Status: SHIPPED | OUTPATIENT
Start: 2025-06-11

## 2025-06-11 RX ORDER — FUROSEMIDE 40 MG/1
20 TABLET ORAL DAILY
Qty: 90 TABLET | Refills: 3 | Status: CANCELLED | OUTPATIENT
Start: 2025-06-11

## 2025-06-11 RX ORDER — ATORVASTATIN CALCIUM 40 MG/1
40 TABLET, FILM COATED ORAL DAILY
Qty: 90 TABLET | Refills: 3 | Status: SHIPPED | OUTPATIENT
Start: 2025-06-11

## 2025-06-11 RX ORDER — FUROSEMIDE 20 MG/1
20 TABLET ORAL DAILY
Qty: 90 TABLET | Refills: 3 | Status: SHIPPED | OUTPATIENT
Start: 2025-06-11

## 2025-06-11 ASSESSMENT — ENCOUNTER SYMPTOMS: SHORTNESS OF BREATH: 0
